# Patient Record
Sex: MALE | Race: BLACK OR AFRICAN AMERICAN | Employment: OTHER | URBAN - METROPOLITAN AREA
[De-identification: names, ages, dates, MRNs, and addresses within clinical notes are randomized per-mention and may not be internally consistent; named-entity substitution may affect disease eponyms.]

---

## 2017-05-19 ENCOUNTER — APPOINTMENT (OUTPATIENT)
Dept: GENERAL RADIOLOGY | Age: 72
End: 2017-05-19
Attending: PHYSICIAN ASSISTANT
Payer: MEDICARE

## 2017-05-19 ENCOUNTER — HOSPITAL ENCOUNTER (EMERGENCY)
Age: 72
Discharge: HOME | End: 2017-05-20
Attending: EMERGENCY MEDICINE | Admitting: EMERGENCY MEDICINE
Payer: MEDICARE

## 2017-05-19 VITALS
RESPIRATION RATE: 16 BRPM | DIASTOLIC BLOOD PRESSURE: 86 MMHG | HEIGHT: 70 IN | WEIGHT: 186.8 LBS | HEART RATE: 60 BPM | SYSTOLIC BLOOD PRESSURE: 153 MMHG | TEMPERATURE: 98.2 F | BODY MASS INDEX: 26.74 KG/M2 | OXYGEN SATURATION: 97 %

## 2017-05-19 DIAGNOSIS — M25.461 KNEE EFFUSION, RIGHT: Primary | ICD-10-CM

## 2017-05-19 PROCEDURE — 75810000054 HC ARTHOCENTISIS JOINT

## 2017-05-19 PROCEDURE — 99283 EMERGENCY DEPT VISIT LOW MDM: CPT

## 2017-05-19 PROCEDURE — 74011250636 HC RX REV CODE- 250/636: Performed by: PHYSICIAN ASSISTANT

## 2017-05-19 PROCEDURE — 89050 BODY FLUID CELL COUNT: CPT | Performed by: PHYSICIAN ASSISTANT

## 2017-05-19 PROCEDURE — 87205 SMEAR GRAM STAIN: CPT | Performed by: PHYSICIAN ASSISTANT

## 2017-05-19 PROCEDURE — 89060 EXAM SYNOVIAL FLUID CRYSTALS: CPT | Performed by: PHYSICIAN ASSISTANT

## 2017-05-19 PROCEDURE — 74011250637 HC RX REV CODE- 250/637: Performed by: PHYSICIAN ASSISTANT

## 2017-05-19 PROCEDURE — 74011000250 HC RX REV CODE- 250: Performed by: PHYSICIAN ASSISTANT

## 2017-05-19 RX ORDER — OXYCODONE AND ACETAMINOPHEN 5; 325 MG/1; MG/1
1 TABLET ORAL
Status: COMPLETED | OUTPATIENT
Start: 2017-05-19 | End: 2017-05-19

## 2017-05-19 RX ORDER — LIDOCAINE HYDROCHLORIDE 20 MG/ML
10 INJECTION, SOLUTION INFILTRATION; PERINEURAL ONCE
Status: COMPLETED | OUTPATIENT
Start: 2017-05-19 | End: 2017-05-19

## 2017-05-19 RX ORDER — TRIAMCINOLONE ACETONIDE 40 MG/ML
40 INJECTION, SUSPENSION INTRA-ARTICULAR; INTRAMUSCULAR ONCE
Status: COMPLETED | OUTPATIENT
Start: 2017-05-19 | End: 2017-05-19

## 2017-05-19 RX ADMIN — LIDOCAINE HYDROCHLORIDE 200 MG: 20 INJECTION, SOLUTION INFILTRATION; PERINEURAL at 22:32

## 2017-05-19 RX ADMIN — LIDOCAINE HYDROCHLORIDE 200 MG: 20 INJECTION, SOLUTION INFILTRATION; PERINEURAL at 23:41

## 2017-05-19 RX ADMIN — TRIAMCINOLONE ACETONIDE 40 MG: 40 INJECTION, SUSPENSION INTRA-ARTICULAR; INTRAMUSCULAR at 22:32

## 2017-05-19 RX ADMIN — OXYCODONE HYDROCHLORIDE AND ACETAMINOPHEN 1 TABLET: 5; 325 TABLET ORAL at 22:58

## 2017-05-20 LAB
APPEARANCE FLD: ABNORMAL
BODY FLD TYPE: NORMAL
COLOR FLD: ABNORMAL
CRYSTALS FLD MICRO: NORMAL
EOSINOPHIL # FLD: 10 %
LYMPHOCYTES NFR FLD: 43 %
MONOS+MACROS NFR FLD: 16 %
NEUTS SEG NFR FLD: 29 %
NUC CELL # FLD: 470 /CU MM (ref 0–5)
OTHER CELL,FOTHC: 2 %
RBC # FLD: >100 /CU MM
SPECIMEN SOURCE FLD: ABNORMAL

## 2017-05-20 NOTE — DISCHARGE INSTRUCTIONS
We hope that we have addressed all of your medical concerns. The examination and treatment you received in the Emergency Department were for an emergent problem and were not intended as complete care. It is important that you follow up with your healthcare provider(s) for ongoing care. If your symptoms worsen or do not improve as expected, and you are unable to reach your usual health care provider(s), you should return to the Emergency Department. Today's healthcare is undergoing tremendous change, and patient satisfaction surveys are one of the many tools to assess the quality of medical care. You may receive a survey from the [employer] regarding your experience in the Emergency Department. I hope that your experience has been completely positive, particularly the medical care that I provided. As such, please participate in the survey; anything less than excellent does not meet my expectations or intentions. [facility] and 8 [facility] participate in nationally recognized quality of care measures. If your blood pressure is greater than 120/80, as reported below, we urge that you seek medical care to address the potential of high blood pressure, commonly known as hypertension. Hypertension can be hereditary or can be caused by certain medical conditions, pain, stress, or \"white coat syndrome. \"       Please make an appointment with your health care provider(s) for follow up of your Emergency Department visit. VITALS:   Patient Vitals for the past 8 hrs:   Temp Pulse Resp BP SpO2   05/19/17 2333 98.2 °F (36.8 °C) 60 16 153/86 97 %   05/19/17 2106 98.2 °F (36.8 °C) 68 16 156/81 95 %          Thank you for allowing us to provide you with medical care today. We realize that you have many choices for your emergency care needs. Please choose us in the future for any continued health care needs.       Regards,           [facility], [address] [place], [place] [address].   Office: [phone]            Recent Results (from the past 24 hour(s))   CELL COUNT, BODY FLUID    Collection Time: 05/19/17 11:00 PM   Result Value Ref Range    BODY FLUID TYPE KNEE FLUID      FLUID COLOR RED      FLUID APPEARANCE BLOODY      FLUID RBC COUNT >100 /cu mm    FLD NUCLEATED CELLS 470 (H) 0 - 5 /cu mm   CRYSTALS, SYNOVIAL FLUID    Collection Time: 05/19/17 11:00 PM   Result Value Ref Range    FLUID TYPE(7) KNEE      Crystals, body fluid NO CRYSTALS SEEN WITH POLARIZED LIGHT     CULTURE, BODY FLUID W GRAM STAIN    Collection Time: 05/19/17 11:00 PM   Result Value Ref Range    Special Requests: NO SPECIAL REQUESTS      GRAM STAIN OCCASIONAL  WBCS SEEN        GRAM STAIN NO ORGANISMS SEEN      Culture result: PENDING        No results found. Knee Pain or Injury: Care Instructions  Your Care Instructions    Injuries are a common cause of knee problems. Sudden (acute) injuries may be caused by a direct blow to the knee. They can also be caused by abnormal twisting, bending, or falling on the knee. Pain, bruising, or swelling may be severe, and may start within minutes of the injury. Overuse is another cause of knee pain. Other causes are climbing stairs, kneeling, and other activities that use the knee. Everyday wear and tear, especially as you get older, also can cause knee pain. Rest, along with home treatment, often relieves pain and allows your knee to heal. If you have a serious knee injury, you may need tests and treatment. Follow-up care is a key part of your treatment and safety. Be sure to make and go to all appointments, and call your doctor if you are having problems. It's also a good idea to know your test results and keep a list of the medicines you take. How can you care for yourself at home? · Be safe with medicines. Read and follow all instructions on the label.   ¨ If the doctor gave you a prescription medicine for pain, take it as prescribed. ¨ If you are not taking a prescription pain medicine, ask your doctor if you can take an over-the-counter medicine. · Rest and protect your knee. Take a break from any activity that may cause pain. · Put ice or a cold pack on your knee for 10 to 20 minutes at a time. Put a thin cloth between the ice and your skin. · Prop up a sore knee on a pillow when you ice it or anytime you sit or lie down for the next 3 days. Try to keep it above the level of your heart. This will help reduce swelling. · If your knee is not swollen, you can put moist heat, a heating pad, or a warm cloth on your knee. · If your doctor recommends an elastic bandage, sleeve, or other type of support for your knee, wear it as directed. · Follow your doctor's instructions about how much weight you can put on your leg. Use a cane, crutches, or a walker as instructed. · Follow your doctor's instructions about activity during your healing process. If you can do mild exercise, slowly increase your activity. · Reach and stay at a healthy weight. Extra weight can strain the joints, especially the knees and hips, and make the pain worse. Losing even a few pounds may help. When should you call for help? Call 911 anytime you think you may need emergency care. For example, call if:  · You have symptoms of a blood clot in your lung (called a pulmonary embolism). These may include:  ¨ Sudden chest pain. ¨ Trouble breathing. ¨ Coughing up blood. Call your doctor now or seek immediate medical care if:  · You have severe or increasing pain. · Your leg or foot turns cold or changes color. · You cannot stand or put weight on your knee. · Your knee looks twisted or bent out of shape. · You cannot move your knee. · You have signs of infection, such as:  ¨ Increased pain, swelling, warmth, or redness. ¨ Red streaks leading from the knee. ¨ Pus draining from a place on your knee. ¨ A fever.   · You have signs of a blood clot in your leg (called a deep vein thrombosis), such as:  ¨ Pain in your calf, back of the knee, thigh, or groin. ¨ Redness and swelling in your leg or groin. Watch closely for changes in your health, and be sure to contact your doctor if:  · You have tingling, weakness, or numbness in your knee. · You have any new symptoms, such as swelling. · You have bruises from a knee injury that last longer than 2 weeks. · You do not get better as expected. Where can you learn more? Go to http://do-kenyatta.info/. Enter K195 in the search box to learn more about \"Knee Pain or Injury: Care Instructions. \"  Current as of: May 27, 2016  Content Version: 11.2  © 4411-3584 [employer]. Care instructions adapted under license by [employer] (which disclaims liability or warranty for this information). If you have questions about a medical condition or this instruction, always ask your healthcare professional. [address] any warranty or liability for your use of this information.

## 2017-05-20 NOTE — ED TRIAGE NOTES
TRIAGE NOTE: \"I had surgery on my right knee 2/9/17 and fluid has built up on it. It's swollen and real tight. \" Patient reports having knee drained before.

## 2017-05-20 NOTE — ED NOTES
I have reviewed discharge instructions with the patient. The patient verbalized understanding. Ambulatory off unit in no acute signs of distress with family.

## 2017-05-20 NOTE — ED PROVIDER NOTES
HPI Comments: 79 yo AAM with medical hx remarkable for arthritis, GERD, hypertension, carpal tunnel syndrome, presenting ambulatory to the ED with complaint of swelling to the rt knee since yesterday. Applied ice with minimal resolution. Hx of the same. Arthroscopy by Dr. Dawn Wilkinson in February 2017. Denies recent trauma. No fever, headache, sore throat, cough, rhinorrhea, sneezing, SOB, abdominal pain, nausea, vomiting, or urinary complaints. Patient is a 80 y.o. male presenting with knee pain. The history is provided by the patient. Knee Pain    Associated symptoms include neck pain. Pertinent negatives include no numbness. Past Medical History:   Diagnosis Date    Arthritis     thumbs    GERD (gastroesophageal reflux disease)     hx of    Hypertension     Other ill-defined conditions     bilateral carpal tunnel       Past Surgical History:   Procedure Laterality Date    HX APPENDECTOMY      HX KNEE ARTHROSCOPY      for torn menicus    HX ORTHOPAEDIC      back    HX OTHER SURGICAL      lipoma removed         Family History:   Problem Relation Age of Onset    Kidney Disease Mother      dialysis    Cancer Father      prostate       Social History     Social History    Marital status:      Spouse name: N/A    Number of children: N/A    Years of education: N/A     Occupational History    Not on file. Social History Main Topics    Smoking status: Never Smoker    Smokeless tobacco: Not on file    Alcohol use No    Drug use: Not on file    Sexual activity: Not on file     Other Topics Concern    Not on file     Social History Narrative         ALLERGIES: Review of patient's allergies indicates no known allergies. Review of Systems   Constitutional: Negative. Negative for chills and fever. HENT: Negative for congestion, ear pain, rhinorrhea, sore throat and voice change. Eyes: Negative. Negative for photophobia, pain and itching.    Respiratory: Negative for cough, chest tightness and shortness of breath. Cardiovascular: Negative for chest pain and palpitations. Gastrointestinal: Negative for abdominal distention, abdominal pain, constipation, diarrhea and vomiting. Genitourinary: Negative for difficulty urinating, dysuria, frequency and urgency. Musculoskeletal: Positive for arthralgias (rt knee), joint swelling and neck pain. Negative for neck stiffness. Skin: Positive for color change. Neurological: Negative for weakness, numbness and headaches. Psychiatric/Behavioral: Negative for confusion and decreased concentration. All other systems reviewed and are negative. Vitals:    05/19/17 2106   BP: 156/81   Pulse: 68   Resp: 16   Temp: 98.2 °F (36.8 °C)   SpO2: 95%   Weight: 84.7 kg (186 lb 12.8 oz)   Height: 5' 10\" (1.778 m)            Physical Exam   Constitutional: He is oriented to person, place, and time. He appears well-developed and well-nourished. No distress. Well appearing AAM in [facility]   HENT:   Head: Normocephalic and atraumatic. Right Ear: External ear normal.   Left Ear: External ear normal.   Nose: Nose normal.   Mouth/Throat: Oropharynx is clear and moist. No oropharyngeal exudate. Eyes: Conjunctivae and EOM are normal. Pupils are equal, round, and reactive to light. Right eye exhibits no discharge. Left eye exhibits no discharge. Neck: Normal range of motion. Neck supple. Cardiovascular: Normal rate, regular rhythm and normal heart sounds. Pulmonary/Chest: Effort normal and breath sounds normal. He has no wheezes. He has no rales. Abdominal: Soft. Bowel sounds are normal. He exhibits no distension. There is no tenderness. There is no guarding. Musculoskeletal: Normal range of motion. Right knee: He exhibits swelling. He exhibits normal range of motion. Tenderness found. Lymphadenopathy:     He has no cervical adenopathy. Neurological: He is alert and oriented to person, place, and time. No cranial nerve deficit. Skin: Skin is warm and dry. He is not diaphoretic. Psychiatric: He has a normal mood and affect. His behavior is normal.   Nursing note and vitals reviewed. MDM  Number of Diagnoses or Management Options  Knee effusion, right:   Diagnosis management comments: 81 yo male with rt knee effusion  Plan  Arthrocentesis. Nery ALARCON [facility], [id number]         Amount and/or Complexity of Data Reviewed  Clinical lab tests: ordered and reviewed      ED Course       Arthrocentesis  Date/Time: 5/19/2017 10:30 PM  Performed by: Nakia Carey  Authorized by: Augusta ALARCON     Consent:     Consent obtained:  Verbal    Consent given by:  Patient    Risks discussed:  Bleeding and incomplete drainage  Location:     Location:  Knee    Knee:  R knee  Procedure details:     Needle gauge:  18 G    Approach:  Lateral (and medial)    Aspirate characteristics:  Blood-tinged    Steroid injected: yes      Specimen collected: yes    Post-procedure details:     Dressing:  Gauze roll    Patient tolerance of procedure: Tolerated well, no immediate complications        Patient's results have been reviewed with them. Patient and/or family have verbally conveyed their understanding and agreement of the patient's signs, symptoms, diagnosis, treatment and prognosis and additionally agree to follow up as recommended or return to the Emergency Room should their condition change prior to follow-up. Discharge instructions have also been provided to the patient with some educational information regarding their diagnosis as well a list of reasons why they would want to return to the ER prior to their follow-up appointment should their condition change. Nery ALARCON [facility], [address]    A/P  Rt knee effusion: Follow-up with ortho. Elevate the leg. Percocet 1 tab every 6 hrs as needed for pain. Return for any new or worsening.  Nery ALARCON [facility], [address]

## 2017-06-02 LAB
BACTERIA SPEC CULT: NORMAL
GRAM STN SPEC: NORMAL
GRAM STN SPEC: NORMAL
SERVICE CMNT-IMP: NORMAL

## 2017-08-04 RX ORDER — ACETAMINOPHEN 500 MG
1000 TABLET ORAL ONCE
Status: CANCELLED | OUTPATIENT
Start: 2017-08-14 | End: 2017-08-14

## 2017-08-04 RX ORDER — CELECOXIB 200 MG/1
200 CAPSULE ORAL ONCE
Status: CANCELLED | OUTPATIENT
Start: 2017-08-14 | End: 2017-08-14

## 2017-08-04 RX ORDER — PREGABALIN 75 MG/1
75 CAPSULE ORAL ONCE
Status: CANCELLED | OUTPATIENT
Start: 2017-08-14 | End: 2017-08-14

## 2017-08-04 RX ORDER — CEFAZOLIN SODIUM IN 0.9 % NACL 2 G/50 ML
2 INTRAVENOUS SOLUTION, PIGGYBACK (ML) INTRAVENOUS ONCE
Status: CANCELLED | OUTPATIENT
Start: 2017-08-14 | End: 2017-08-14

## 2017-08-04 RX ORDER — DEXAMETHASONE SODIUM PHOSPHATE 100 MG/10ML
10 INJECTION INTRAMUSCULAR; INTRAVENOUS ONCE
Status: CANCELLED | OUTPATIENT
Start: 2017-08-14 | End: 2017-08-14

## 2017-08-07 ENCOUNTER — HOSPITAL ENCOUNTER (OUTPATIENT)
Dept: PREADMISSION TESTING | Age: 72
Discharge: HOME | End: 2017-08-07
Payer: MEDICARE

## 2017-08-07 VITALS
DIASTOLIC BLOOD PRESSURE: 79 MMHG | BODY MASS INDEX: 25.91 KG/M2 | WEIGHT: 181 LBS | TEMPERATURE: 97.9 F | HEIGHT: 70 IN | SYSTOLIC BLOOD PRESSURE: 167 MMHG | HEART RATE: 55 BPM

## 2017-08-07 LAB
ABO + RH BLD: NORMAL
ANION GAP BLD CALC-SCNC: 6 MMOL/L (ref 5–15)
APPEARANCE UR: CLEAR
ATRIAL RATE: 57 BPM
BACTERIA URNS QL MICRO: NEGATIVE /HPF
BILIRUB UR QL: NEGATIVE
BLOOD GROUP ANTIBODIES SERPL: NORMAL
BUN SERPL-MCNC: 17 MG/DL (ref 6–20)
BUN/CREAT SERPL: 14 (ref 12–20)
CALCIUM SERPL-MCNC: 8.7 MG/DL (ref 8.5–10.1)
CALCULATED P AXIS, ECG09: 49 DEGREES
CALCULATED R AXIS, ECG10: 36 DEGREES
CALCULATED T AXIS, ECG11: 25 DEGREES
CHLORIDE SERPL-SCNC: 105 MMOL/L (ref 97–108)
CO2 SERPL-SCNC: 30 MMOL/L (ref 21–32)
COLOR UR: ABNORMAL
CREAT SERPL-MCNC: 1.25 MG/DL (ref 0.7–1.3)
DIAGNOSIS, 93000: NORMAL
EPITH CASTS URNS QL MICRO: ABNORMAL /LPF
ERYTHROCYTE [DISTWIDTH] IN BLOOD BY AUTOMATED COUNT: 12.4 % (ref 11.5–14.5)
EST. AVERAGE GLUCOSE BLD GHB EST-MCNC: 120 MG/DL
GLUCOSE SERPL-MCNC: 102 MG/DL (ref 65–100)
GLUCOSE UR STRIP.AUTO-MCNC: NEGATIVE MG/DL
HBA1C MFR BLD: 5.8 % (ref 4.2–6.3)
HCT VFR BLD AUTO: 34.6 % (ref 36.6–50.3)
HGB BLD-MCNC: 11.9 G/DL (ref 12.1–17)
HGB UR QL STRIP: NEGATIVE
HYALINE CASTS URNS QL MICRO: ABNORMAL /LPF (ref 0–5)
INR PPP: 1.1 (ref 0.9–1.1)
KETONES UR QL STRIP.AUTO: NEGATIVE MG/DL
LEUKOCYTE ESTERASE UR QL STRIP.AUTO: NEGATIVE
MCH RBC QN AUTO: 30.1 PG (ref 26–34)
MCHC RBC AUTO-ENTMCNC: 34.4 G/DL (ref 30–36.5)
MCV RBC AUTO: 87.4 FL (ref 80–99)
NITRITE UR QL STRIP.AUTO: NEGATIVE
P-R INTERVAL, ECG05: 166 MS
PH UR STRIP: 7 [PH] (ref 5–8)
PLATELET # BLD AUTO: 213 K/UL (ref 150–400)
POTASSIUM SERPL-SCNC: 3.8 MMOL/L (ref 3.5–5.1)
PROT UR STRIP-MCNC: 30 MG/DL
PROTHROMBIN TIME: 11 SEC (ref 9–11.1)
Q-T INTERVAL, ECG07: 424 MS
QRS DURATION, ECG06: 90 MS
QTC CALCULATION (BEZET), ECG08: 412 MS
RBC # BLD AUTO: 3.96 M/UL (ref 4.1–5.7)
RBC #/AREA URNS HPF: ABNORMAL /HPF (ref 0–5)
SODIUM SERPL-SCNC: 141 MMOL/L (ref 136–145)
SP GR UR REFRACTOMETRY: 1.02 (ref 1–1.03)
SPECIMEN EXP DATE BLD: NORMAL
UA: UC IF INDICATED,UAUC: ABNORMAL
UROBILINOGEN UR QL STRIP.AUTO: 0.2 EU/DL (ref 0.2–1)
VENTRICULAR RATE, ECG03: 57 BPM
WBC # BLD AUTO: 3.5 K/UL (ref 4.1–11.1)
WBC URNS QL MICRO: ABNORMAL /HPF (ref 0–4)

## 2017-08-07 PROCEDURE — 85027 COMPLETE CBC AUTOMATED: CPT | Performed by: ORTHOPAEDIC SURGERY

## 2017-08-07 PROCEDURE — 83036 HEMOGLOBIN GLYCOSYLATED A1C: CPT | Performed by: ORTHOPAEDIC SURGERY

## 2017-08-07 PROCEDURE — 81001 URINALYSIS AUTO W/SCOPE: CPT | Performed by: ORTHOPAEDIC SURGERY

## 2017-08-07 PROCEDURE — 93005 ELECTROCARDIOGRAM TRACING: CPT

## 2017-08-07 PROCEDURE — 85610 PROTHROMBIN TIME: CPT | Performed by: ORTHOPAEDIC SURGERY

## 2017-08-07 PROCEDURE — 86900 BLOOD TYPING SEROLOGIC ABO: CPT | Performed by: ORTHOPAEDIC SURGERY

## 2017-08-07 PROCEDURE — 80048 BASIC METABOLIC PNL TOTAL CA: CPT | Performed by: ORTHOPAEDIC SURGERY

## 2017-08-07 PROCEDURE — 36415 COLL VENOUS BLD VENIPUNCTURE: CPT | Performed by: ORTHOPAEDIC SURGERY

## 2017-08-07 RX ORDER — CALCIUM CARBONATE 600 MG
600 TABLET ORAL DAILY
COMMUNITY

## 2017-08-07 RX ORDER — DOXAZOSIN 2 MG/1
2 TABLET ORAL
COMMUNITY

## 2017-08-07 RX ORDER — LANOLIN ALCOHOL/MO/W.PET/CERES
65 CREAM (GRAM) TOPICAL
COMMUNITY

## 2017-08-08 LAB
BACTERIA SPEC CULT: NORMAL
BACTERIA SPEC CULT: NORMAL
SERVICE CMNT-IMP: NORMAL

## 2017-08-14 ENCOUNTER — ANESTHESIA (OUTPATIENT)
Dept: SURGERY | Age: 72
End: 2017-08-14
Payer: MEDICARE

## 2017-08-14 ENCOUNTER — ANESTHESIA EVENT (OUTPATIENT)
Dept: SURGERY | Age: 72
End: 2017-08-14
Payer: MEDICARE

## 2017-08-14 ENCOUNTER — HOSPITAL ENCOUNTER (OUTPATIENT)
Age: 72
Setting detail: OBSERVATION
Discharge: HOME | End: 2017-08-15
Attending: ORTHOPAEDIC SURGERY | Admitting: ORTHOPAEDIC SURGERY
Payer: MEDICARE

## 2017-08-14 PROBLEM — M17.11 OSTEOARTHRITIS OF RIGHT KNEE: Status: ACTIVE | Noted: 2017-08-14

## 2017-08-14 LAB
GLUCOSE BLD STRIP.AUTO-MCNC: 122 MG/DL (ref 65–100)
SERVICE CMNT-IMP: ABNORMAL

## 2017-08-14 PROCEDURE — 82962 GLUCOSE BLOOD TEST: CPT

## 2017-08-14 PROCEDURE — 77030020782 HC GWN BAIR PAWS FLX 3M -B

## 2017-08-14 PROCEDURE — 97165 OT EVAL LOW COMPLEX 30 MIN: CPT | Performed by: OCCUPATIONAL THERAPIST

## 2017-08-14 PROCEDURE — 99218 HC RM OBSERVATION: CPT

## 2017-08-14 PROCEDURE — 74011250636 HC RX REV CODE- 250/636: Performed by: ANESTHESIOLOGY

## 2017-08-14 PROCEDURE — G8988 SELF CARE GOAL STATUS: HCPCS | Performed by: OCCUPATIONAL THERAPIST

## 2017-08-14 PROCEDURE — 76010000171 HC OR TIME 2 TO 2.5 HR INTENSV-TIER 1: Performed by: ORTHOPAEDIC SURGERY

## 2017-08-14 PROCEDURE — 74011000250 HC RX REV CODE- 250: Performed by: ORTHOPAEDIC SURGERY

## 2017-08-14 PROCEDURE — C1776 JOINT DEVICE (IMPLANTABLE): HCPCS | Performed by: ORTHOPAEDIC SURGERY

## 2017-08-14 PROCEDURE — 74011250636 HC RX REV CODE- 250/636

## 2017-08-14 PROCEDURE — 76060000035 HC ANESTHESIA 2 TO 2.5 HR: Performed by: ORTHOPAEDIC SURGERY

## 2017-08-14 PROCEDURE — 74011000258 HC RX REV CODE- 258: Performed by: ORTHOPAEDIC SURGERY

## 2017-08-14 PROCEDURE — 74011250637 HC RX REV CODE- 250/637: Performed by: ORTHOPAEDIC SURGERY

## 2017-08-14 PROCEDURE — 77030000032 HC CUF TRNQT ZIMM -B: Performed by: ORTHOPAEDIC SURGERY

## 2017-08-14 PROCEDURE — 77030007866 HC KT SPN ANES BBMI -B: Performed by: ANESTHESIOLOGY

## 2017-08-14 PROCEDURE — 77030012935 HC DRSG AQUACEL BMS -B: Performed by: ORTHOPAEDIC SURGERY

## 2017-08-14 PROCEDURE — 97535 SELF CARE MNGMENT TRAINING: CPT | Performed by: OCCUPATIONAL THERAPIST

## 2017-08-14 PROCEDURE — 77030020788: Performed by: ORTHOPAEDIC SURGERY

## 2017-08-14 PROCEDURE — 97161 PT EVAL LOW COMPLEX 20 MIN: CPT

## 2017-08-14 PROCEDURE — 77030031139 HC SUT VCRL2 J&J -A: Performed by: ORTHOPAEDIC SURGERY

## 2017-08-14 PROCEDURE — 77030011640 HC PAD GRND REM COVD -A: Performed by: ORTHOPAEDIC SURGERY

## 2017-08-14 PROCEDURE — 74011250636 HC RX REV CODE- 250/636: Performed by: ORTHOPAEDIC SURGERY

## 2017-08-14 PROCEDURE — C9290 INJ, BUPIVACAINE LIPOSOME: HCPCS | Performed by: ORTHOPAEDIC SURGERY

## 2017-08-14 PROCEDURE — G8979 MOBILITY GOAL STATUS: HCPCS

## 2017-08-14 PROCEDURE — G8978 MOBILITY CURRENT STATUS: HCPCS

## 2017-08-14 PROCEDURE — 77030011943: Performed by: ORTHOPAEDIC SURGERY

## 2017-08-14 PROCEDURE — 77030018836 HC SOL IRR NACL ICUM -A: Performed by: ORTHOPAEDIC SURGERY

## 2017-08-14 PROCEDURE — G8989 SELF CARE D/C STATUS: HCPCS | Performed by: OCCUPATIONAL THERAPIST

## 2017-08-14 PROCEDURE — 76210000016 HC OR PH I REC 1 TO 1.5 HR: Performed by: ORTHOPAEDIC SURGERY

## 2017-08-14 PROCEDURE — 97116 GAIT TRAINING THERAPY: CPT

## 2017-08-14 PROCEDURE — 77030002933 HC SUT MCRYL J&J -A: Performed by: ORTHOPAEDIC SURGERY

## 2017-08-14 PROCEDURE — 77030010507 HC ADH SKN DERMBND J&J -B: Performed by: ORTHOPAEDIC SURGERY

## 2017-08-14 PROCEDURE — 77030003601 HC NDL NRV BLK BBMI -A

## 2017-08-14 PROCEDURE — 77030006812 HC BLD SAW RECIP STRY -B: Performed by: ORTHOPAEDIC SURGERY

## 2017-08-14 PROCEDURE — G8987 SELF CARE CURRENT STATUS: HCPCS | Performed by: OCCUPATIONAL THERAPIST

## 2017-08-14 PROCEDURE — 77030028224 HC PDNG CST BSNM -A: Performed by: ORTHOPAEDIC SURGERY

## 2017-08-14 PROCEDURE — 77030033067 HC SUT PDO STRATFX SPIR J&J -B: Performed by: ORTHOPAEDIC SURGERY

## 2017-08-14 PROCEDURE — 76010000131 HC OR TIME 2 TO 2.5 HR: Performed by: ORTHOPAEDIC SURGERY

## 2017-08-14 PROCEDURE — 77030006835 HC BLD SAW SAG STRY -B: Performed by: ORTHOPAEDIC SURGERY

## 2017-08-14 PROCEDURE — G8980 MOBILITY D/C STATUS: HCPCS

## 2017-08-14 PROCEDURE — C1713 ANCHOR/SCREW BN/BN,TIS/BN: HCPCS | Performed by: ORTHOPAEDIC SURGERY

## 2017-08-14 PROCEDURE — 74011000250 HC RX REV CODE- 250

## 2017-08-14 PROCEDURE — 77030010788: Performed by: ORTHOPAEDIC SURGERY

## 2017-08-14 DEVICE — CEMENT BNE 40GM FULL DOSE PMMA W/ GENT HI VISC RADPQ LNG: Type: IMPLANTABLE DEVICE | Site: KNEE | Status: FUNCTIONAL

## 2017-08-14 DEVICE — ARTICULAR SURFACE SIZE 4 10MM
Type: IMPLANTABLE DEVICE | Site: KNEE | Status: FUNCTIONAL
Brand: ZUK

## 2017-08-14 DEVICE — FEMORAL SIZE D RT MEDIAL/LT LATERAL
Type: IMPLANTABLE DEVICE | Site: KNEE | Status: FUNCTIONAL
Brand: ZUK

## 2017-08-14 DEVICE — KNEE CEM FLX FEM/TIB UNIFLX -- K1 BSV-KY-SC K2-NY: Type: IMPLANTABLE DEVICE | Status: FUNCTIONAL

## 2017-08-14 DEVICE — TIBIA BASE SIZE 4 RIGHT                                    MEDIAL/LEFT LATERAL
Type: IMPLANTABLE DEVICE | Site: KNEE | Status: FUNCTIONAL
Brand: ZUK

## 2017-08-14 RX ORDER — SODIUM CHLORIDE 9 MG/ML
25 INJECTION, SOLUTION INTRAVENOUS CONTINUOUS
Status: DISCONTINUED | OUTPATIENT
Start: 2017-08-14 | End: 2017-08-14 | Stop reason: HOSPADM

## 2017-08-14 RX ORDER — ASPIRIN 325 MG
325 TABLET, DELAYED RELEASE (ENTERIC COATED) ORAL 2 TIMES DAILY
Qty: 60 TAB | Refills: 0 | Status: SHIPPED | OUTPATIENT
Start: 2017-08-14

## 2017-08-14 RX ORDER — PROPOFOL 10 MG/ML
INJECTION, EMULSION INTRAVENOUS
Status: DISCONTINUED | OUTPATIENT
Start: 2017-08-14 | End: 2017-08-14 | Stop reason: HOSPADM

## 2017-08-14 RX ORDER — DIPHENHYDRAMINE HYDROCHLORIDE 50 MG/ML
12.5 INJECTION, SOLUTION INTRAMUSCULAR; INTRAVENOUS AS NEEDED
Status: DISCONTINUED | OUTPATIENT
Start: 2017-08-14 | End: 2017-08-14 | Stop reason: HOSPADM

## 2017-08-14 RX ORDER — DOXAZOSIN 2 MG/1
2 TABLET ORAL
Status: DISCONTINUED | OUTPATIENT
Start: 2017-08-14 | End: 2017-08-15 | Stop reason: HOSPADM

## 2017-08-14 RX ORDER — DICLOFENAC SODIUM 50 MG/1
50 TABLET, DELAYED RELEASE ORAL 2 TIMES DAILY
Qty: 42 TAB | Refills: 0 | Status: SHIPPED | OUTPATIENT
Start: 2017-08-15

## 2017-08-14 RX ORDER — CEFAZOLIN SODIUM IN 0.9 % NACL 2 G/50 ML
2 INTRAVENOUS SOLUTION, PIGGYBACK (ML) INTRAVENOUS ONCE
Status: COMPLETED | OUTPATIENT
Start: 2017-08-14 | End: 2017-08-14

## 2017-08-14 RX ORDER — DEXAMETHASONE SODIUM PHOSPHATE 10 MG/ML
10 INJECTION INTRAMUSCULAR; INTRAVENOUS ONCE
Status: COMPLETED | OUTPATIENT
Start: 2017-08-14 | End: 2017-08-14

## 2017-08-14 RX ORDER — ONDANSETRON 2 MG/ML
4 INJECTION INTRAMUSCULAR; INTRAVENOUS
Status: ACTIVE | OUTPATIENT
Start: 2017-08-14 | End: 2017-08-15

## 2017-08-14 RX ORDER — TRAMADOL HYDROCHLORIDE 50 MG/1
50 TABLET ORAL EVERY 6 HOURS
Qty: 80 TAB | Refills: 0 | Status: SHIPPED | OUTPATIENT
Start: 2017-08-14 | End: 2019-03-06

## 2017-08-14 RX ORDER — FACIAL-BODY WIPES
10 EACH TOPICAL DAILY PRN
Status: DISCONTINUED | OUTPATIENT
Start: 2017-08-16 | End: 2017-08-15 | Stop reason: HOSPADM

## 2017-08-14 RX ORDER — LOSARTAN POTASSIUM 50 MG/1
100 TABLET ORAL DAILY
Status: DISCONTINUED | OUTPATIENT
Start: 2017-08-15 | End: 2017-08-15 | Stop reason: HOSPADM

## 2017-08-14 RX ORDER — HYDROXYZINE HYDROCHLORIDE 10 MG/1
10 TABLET, FILM COATED ORAL
Status: DISCONTINUED | OUTPATIENT
Start: 2017-08-14 | End: 2017-08-15 | Stop reason: HOSPADM

## 2017-08-14 RX ORDER — CARVEDILOL 12.5 MG/1
12.5 TABLET ORAL 2 TIMES DAILY WITH MEALS
Status: DISCONTINUED | OUTPATIENT
Start: 2017-08-14 | End: 2017-08-15 | Stop reason: HOSPADM

## 2017-08-14 RX ORDER — SODIUM CHLORIDE 0.9 % (FLUSH) 0.9 %
5-10 SYRINGE (ML) INJECTION AS NEEDED
Status: DISCONTINUED | OUTPATIENT
Start: 2017-08-14 | End: 2017-08-15 | Stop reason: HOSPADM

## 2017-08-14 RX ORDER — DEXMEDETOMIDINE HYDROCHLORIDE 4 UG/ML
INJECTION, SOLUTION INTRAVENOUS AS NEEDED
Status: DISCONTINUED | OUTPATIENT
Start: 2017-08-14 | End: 2017-08-14 | Stop reason: HOSPADM

## 2017-08-14 RX ORDER — ACETAMINOPHEN 325 MG/1
650 TABLET ORAL EVERY 6 HOURS
Status: DISCONTINUED | OUTPATIENT
Start: 2017-08-14 | End: 2017-08-15 | Stop reason: HOSPADM

## 2017-08-14 RX ORDER — MIDAZOLAM HYDROCHLORIDE 1 MG/ML
1 INJECTION, SOLUTION INTRAMUSCULAR; INTRAVENOUS AS NEEDED
Status: DISCONTINUED | OUTPATIENT
Start: 2017-08-14 | End: 2017-08-14 | Stop reason: HOSPADM

## 2017-08-14 RX ORDER — TRANEXAMIC ACID 100 MG/ML
2000 INJECTION, SOLUTION INTRAVENOUS ONCE
Status: COMPLETED | OUTPATIENT
Start: 2017-08-14 | End: 2017-08-14

## 2017-08-14 RX ORDER — ROPIVACAINE HYDROCHLORIDE 5 MG/ML
INJECTION, SOLUTION EPIDURAL; INFILTRATION; PERINEURAL AS NEEDED
Status: DISCONTINUED | OUTPATIENT
Start: 2017-08-14 | End: 2017-08-14 | Stop reason: HOSPADM

## 2017-08-14 RX ORDER — CEFAZOLIN SODIUM IN 0.9 % NACL 2 G/50 ML
2 INTRAVENOUS SOLUTION, PIGGYBACK (ML) INTRAVENOUS ONCE
Status: DISCONTINUED | OUTPATIENT
Start: 2017-08-14 | End: 2017-08-14 | Stop reason: SDUPTHER

## 2017-08-14 RX ORDER — ACETAMINOPHEN 500 MG
1000 TABLET ORAL ONCE
Status: COMPLETED | OUTPATIENT
Start: 2017-08-14 | End: 2017-08-14

## 2017-08-14 RX ORDER — SODIUM CHLORIDE, SODIUM LACTATE, POTASSIUM CHLORIDE, CALCIUM CHLORIDE 600; 310; 30; 20 MG/100ML; MG/100ML; MG/100ML; MG/100ML
25 INJECTION, SOLUTION INTRAVENOUS CONTINUOUS
Status: DISCONTINUED | OUTPATIENT
Start: 2017-08-14 | End: 2017-08-14 | Stop reason: HOSPADM

## 2017-08-14 RX ORDER — MELATONIN
1000 DAILY
Status: DISCONTINUED | OUTPATIENT
Start: 2017-08-15 | End: 2017-08-15 | Stop reason: HOSPADM

## 2017-08-14 RX ORDER — FAMOTIDINE 20 MG/1
20 TABLET, FILM COATED ORAL 2 TIMES DAILY
Status: DISCONTINUED | OUTPATIENT
Start: 2017-08-14 | End: 2017-08-15 | Stop reason: HOSPADM

## 2017-08-14 RX ORDER — ASPIRIN 325 MG
325 TABLET, DELAYED RELEASE (ENTERIC COATED) ORAL 2 TIMES DAILY
Status: DISCONTINUED | OUTPATIENT
Start: 2017-08-14 | End: 2017-08-15 | Stop reason: HOSPADM

## 2017-08-14 RX ORDER — DICLOFENAC SODIUM 50 MG/1
50 TABLET, DELAYED RELEASE ORAL 2 TIMES DAILY
Status: DISCONTINUED | OUTPATIENT
Start: 2017-08-15 | End: 2017-08-15 | Stop reason: HOSPADM

## 2017-08-14 RX ORDER — ACETAMINOPHEN 500 MG
1000 TABLET ORAL ONCE
Status: DISCONTINUED | OUTPATIENT
Start: 2017-08-14 | End: 2017-08-14 | Stop reason: SDUPTHER

## 2017-08-14 RX ORDER — HYDROMORPHONE HYDROCHLORIDE 1 MG/ML
0.2 INJECTION, SOLUTION INTRAMUSCULAR; INTRAVENOUS; SUBCUTANEOUS
Status: DISCONTINUED | OUTPATIENT
Start: 2017-08-14 | End: 2017-08-14 | Stop reason: HOSPADM

## 2017-08-14 RX ORDER — DEXAMETHASONE SODIUM PHOSPHATE 100 MG/10ML
10 INJECTION INTRAMUSCULAR; INTRAVENOUS ONCE
Status: DISCONTINUED | OUTPATIENT
Start: 2017-08-14 | End: 2017-08-14 | Stop reason: SDUPTHER

## 2017-08-14 RX ORDER — CEFAZOLIN SODIUM IN 0.9 % NACL 2 G/50 ML
2 INTRAVENOUS SOLUTION, PIGGYBACK (ML) INTRAVENOUS EVERY 8 HOURS
Status: COMPLETED | OUTPATIENT
Start: 2017-08-14 | End: 2017-08-14

## 2017-08-14 RX ORDER — AMOXICILLIN 250 MG
1 CAPSULE ORAL 2 TIMES DAILY
Status: DISCONTINUED | OUTPATIENT
Start: 2017-08-14 | End: 2017-08-15 | Stop reason: HOSPADM

## 2017-08-14 RX ORDER — SODIUM CHLORIDE 0.9 % (FLUSH) 0.9 %
5-10 SYRINGE (ML) INJECTION EVERY 8 HOURS
Status: DISCONTINUED | OUTPATIENT
Start: 2017-08-14 | End: 2017-08-14 | Stop reason: HOSPADM

## 2017-08-14 RX ORDER — ONDANSETRON 2 MG/ML
4 INJECTION INTRAMUSCULAR; INTRAVENOUS AS NEEDED
Status: DISCONTINUED | OUTPATIENT
Start: 2017-08-14 | End: 2017-08-14 | Stop reason: HOSPADM

## 2017-08-14 RX ORDER — OXYCODONE AND ACETAMINOPHEN 5; 325 MG/1; MG/1
1 TABLET ORAL AS NEEDED
Status: DISCONTINUED | OUTPATIENT
Start: 2017-08-14 | End: 2017-08-14 | Stop reason: HOSPADM

## 2017-08-14 RX ORDER — SODIUM CHLORIDE 0.9 % (FLUSH) 0.9 %
5-10 SYRINGE (ML) INJECTION AS NEEDED
Status: DISCONTINUED | OUTPATIENT
Start: 2017-08-14 | End: 2017-08-14 | Stop reason: HOSPADM

## 2017-08-14 RX ORDER — POLYETHYLENE GLYCOL 3350 17 G/17G
17 POWDER, FOR SOLUTION ORAL DAILY
Status: DISCONTINUED | OUTPATIENT
Start: 2017-08-14 | End: 2017-08-15 | Stop reason: HOSPADM

## 2017-08-14 RX ORDER — AMLODIPINE BESYLATE 5 MG/1
10 TABLET ORAL DAILY
Status: DISCONTINUED | OUTPATIENT
Start: 2017-08-15 | End: 2017-08-15 | Stop reason: HOSPADM

## 2017-08-14 RX ORDER — MIDAZOLAM HYDROCHLORIDE 1 MG/ML
0.5 INJECTION, SOLUTION INTRAMUSCULAR; INTRAVENOUS
Status: DISCONTINUED | OUTPATIENT
Start: 2017-08-14 | End: 2017-08-14 | Stop reason: HOSPADM

## 2017-08-14 RX ORDER — HYDROMORPHONE HYDROCHLORIDE 1 MG/ML
0.5 INJECTION, SOLUTION INTRAMUSCULAR; INTRAVENOUS; SUBCUTANEOUS
Status: ACTIVE | OUTPATIENT
Start: 2017-08-14 | End: 2017-08-15

## 2017-08-14 RX ORDER — BUPIVACAINE HYDROCHLORIDE 5 MG/ML
INJECTION, SOLUTION EPIDURAL; INTRACAUDAL AS NEEDED
Status: DISCONTINUED | OUTPATIENT
Start: 2017-08-14 | End: 2017-08-14 | Stop reason: HOSPADM

## 2017-08-14 RX ORDER — PREGABALIN 75 MG/1
75 CAPSULE ORAL ONCE
Status: COMPLETED | OUTPATIENT
Start: 2017-08-14 | End: 2017-08-14

## 2017-08-14 RX ORDER — MORPHINE SULFATE 10 MG/ML
2 INJECTION, SOLUTION INTRAMUSCULAR; INTRAVENOUS
Status: DISCONTINUED | OUTPATIENT
Start: 2017-08-14 | End: 2017-08-14 | Stop reason: HOSPADM

## 2017-08-14 RX ORDER — SODIUM CHLORIDE, SODIUM LACTATE, POTASSIUM CHLORIDE, CALCIUM CHLORIDE 600; 310; 30; 20 MG/100ML; MG/100ML; MG/100ML; MG/100ML
INJECTION, SOLUTION INTRAVENOUS
Status: DISCONTINUED | OUTPATIENT
Start: 2017-08-14 | End: 2017-08-14 | Stop reason: HOSPADM

## 2017-08-14 RX ORDER — EPINEPHRINE 1 MG/ML
0.4 INJECTION, SOLUTION, CONCENTRATE INTRAVENOUS ONCE
Status: COMPLETED | OUTPATIENT
Start: 2017-08-14 | End: 2017-08-14

## 2017-08-14 RX ORDER — SODIUM CHLORIDE 9 MG/ML
125 INJECTION, SOLUTION INTRAVENOUS CONTINUOUS
Status: DISPENSED | OUTPATIENT
Start: 2017-08-14 | End: 2017-08-15

## 2017-08-14 RX ORDER — SODIUM CHLORIDE 0.9 % (FLUSH) 0.9 %
5-10 SYRINGE (ML) INJECTION EVERY 8 HOURS
Status: DISCONTINUED | OUTPATIENT
Start: 2017-08-15 | End: 2017-08-15 | Stop reason: HOSPADM

## 2017-08-14 RX ORDER — CELECOXIB 200 MG/1
200 CAPSULE ORAL ONCE
Status: DISCONTINUED | OUTPATIENT
Start: 2017-08-14 | End: 2017-08-14 | Stop reason: SDUPTHER

## 2017-08-14 RX ORDER — CELECOXIB 200 MG/1
200 CAPSULE ORAL ONCE
Status: COMPLETED | OUTPATIENT
Start: 2017-08-14 | End: 2017-08-14

## 2017-08-14 RX ORDER — NALOXONE HYDROCHLORIDE 0.4 MG/ML
0.4 INJECTION, SOLUTION INTRAMUSCULAR; INTRAVENOUS; SUBCUTANEOUS AS NEEDED
Status: DISCONTINUED | OUTPATIENT
Start: 2017-08-14 | End: 2017-08-15 | Stop reason: HOSPADM

## 2017-08-14 RX ORDER — HYDROMORPHONE HYDROCHLORIDE 2 MG/1
2 TABLET ORAL
Qty: 80 TAB | Refills: 0 | Status: SHIPPED | OUTPATIENT
Start: 2017-08-14

## 2017-08-14 RX ORDER — FENTANYL CITRATE 50 UG/ML
50 INJECTION, SOLUTION INTRAMUSCULAR; INTRAVENOUS AS NEEDED
Status: DISCONTINUED | OUTPATIENT
Start: 2017-08-14 | End: 2017-08-14 | Stop reason: HOSPADM

## 2017-08-14 RX ORDER — SODIUM CHLORIDE, SODIUM LACTATE, POTASSIUM CHLORIDE, CALCIUM CHLORIDE 600; 310; 30; 20 MG/100ML; MG/100ML; MG/100ML; MG/100ML
125 INJECTION, SOLUTION INTRAVENOUS CONTINUOUS
Status: DISCONTINUED | OUTPATIENT
Start: 2017-08-14 | End: 2017-08-14 | Stop reason: HOSPADM

## 2017-08-14 RX ORDER — ONDANSETRON 2 MG/ML
INJECTION INTRAMUSCULAR; INTRAVENOUS AS NEEDED
Status: DISCONTINUED | OUTPATIENT
Start: 2017-08-14 | End: 2017-08-14 | Stop reason: HOSPADM

## 2017-08-14 RX ORDER — FENTANYL CITRATE 50 UG/ML
25 INJECTION, SOLUTION INTRAMUSCULAR; INTRAVENOUS
Status: DISCONTINUED | OUTPATIENT
Start: 2017-08-14 | End: 2017-08-14 | Stop reason: HOSPADM

## 2017-08-14 RX ORDER — TRAMADOL HYDROCHLORIDE 50 MG/1
50 TABLET ORAL EVERY 6 HOURS
Status: DISCONTINUED | OUTPATIENT
Start: 2017-08-14 | End: 2017-08-15 | Stop reason: HOSPADM

## 2017-08-14 RX ORDER — PREGABALIN 75 MG/1
75 CAPSULE ORAL ONCE
Status: DISCONTINUED | OUTPATIENT
Start: 2017-08-14 | End: 2017-08-14 | Stop reason: SDUPTHER

## 2017-08-14 RX ORDER — KETOROLAC TROMETHAMINE 30 MG/ML
15 INJECTION, SOLUTION INTRAMUSCULAR; INTRAVENOUS EVERY 6 HOURS
Status: DISCONTINUED | OUTPATIENT
Start: 2017-08-14 | End: 2017-08-15 | Stop reason: HOSPADM

## 2017-08-14 RX ORDER — MIDAZOLAM HYDROCHLORIDE 1 MG/ML
INJECTION, SOLUTION INTRAMUSCULAR; INTRAVENOUS AS NEEDED
Status: DISCONTINUED | OUTPATIENT
Start: 2017-08-14 | End: 2017-08-14 | Stop reason: HOSPADM

## 2017-08-14 RX ORDER — LIDOCAINE HYDROCHLORIDE 10 MG/ML
0.1 INJECTION, SOLUTION EPIDURAL; INFILTRATION; INTRACAUDAL; PERINEURAL AS NEEDED
Status: DISCONTINUED | OUTPATIENT
Start: 2017-08-14 | End: 2017-08-14 | Stop reason: HOSPADM

## 2017-08-14 RX ADMIN — CEFAZOLIN 2 G: 1 INJECTION, POWDER, FOR SOLUTION INTRAMUSCULAR; INTRAVENOUS; PARENTERAL at 15:48

## 2017-08-14 RX ADMIN — DEXMEDETOMIDINE HYDROCHLORIDE 10 MCG: 4 INJECTION, SOLUTION INTRAVENOUS at 08:53

## 2017-08-14 RX ADMIN — ROPIVACAINE HYDROCHLORIDE 25 ML: 5 INJECTION, SOLUTION EPIDURAL; INFILTRATION; PERINEURAL at 07:13

## 2017-08-14 RX ADMIN — DEXMEDETOMIDINE HYDROCHLORIDE 10 MCG: 4 INJECTION, SOLUTION INTRAVENOUS at 07:24

## 2017-08-14 RX ADMIN — ACETAMINOPHEN 650 MG: 325 TABLET, FILM COATED ORAL at 19:05

## 2017-08-14 RX ADMIN — DEXAMETHASONE SODIUM PHOSPHATE 10 MG: 10 INJECTION, SOLUTION INTRAMUSCULAR; INTRAVENOUS at 07:43

## 2017-08-14 RX ADMIN — KETOROLAC TROMETHAMINE 15 MG: 30 INJECTION, SOLUTION INTRAMUSCULAR at 23:19

## 2017-08-14 RX ADMIN — SODIUM CHLORIDE 125 ML/HR: 900 INJECTION, SOLUTION INTRAVENOUS at 10:17

## 2017-08-14 RX ADMIN — SODIUM CHLORIDE, SODIUM LACTATE, POTASSIUM CHLORIDE, CALCIUM CHLORIDE: 600; 310; 30; 20 INJECTION, SOLUTION INTRAVENOUS at 07:52

## 2017-08-14 RX ADMIN — CARVEDILOL 12.5 MG: 12.5 TABLET, FILM COATED ORAL at 16:59

## 2017-08-14 RX ADMIN — DOXAZOSIN 2 MG: 2 TABLET ORAL at 21:54

## 2017-08-14 RX ADMIN — BUPIVACAINE HYDROCHLORIDE 15 MG: 5 INJECTION, SOLUTION EPIDURAL; INTRACAUDAL at 07:38

## 2017-08-14 RX ADMIN — TRAMADOL HYDROCHLORIDE 50 MG: 50 TABLET, FILM COATED ORAL at 19:05

## 2017-08-14 RX ADMIN — ASPIRIN 325 MG: 325 TABLET, DELAYED RELEASE ORAL at 13:08

## 2017-08-14 RX ADMIN — CELECOXIB 200 MG: 200 CAPSULE ORAL at 06:45

## 2017-08-14 RX ADMIN — FAMOTIDINE 20 MG: 20 TABLET ORAL at 17:01

## 2017-08-14 RX ADMIN — MIDAZOLAM HYDROCHLORIDE 2 MG: 1 INJECTION, SOLUTION INTRAMUSCULAR; INTRAVENOUS at 07:07

## 2017-08-14 RX ADMIN — KETOROLAC TROMETHAMINE 15 MG: 30 INJECTION, SOLUTION INTRAMUSCULAR at 17:01

## 2017-08-14 RX ADMIN — ONDANSETRON 4 MG: 2 INJECTION INTRAMUSCULAR; INTRAVENOUS at 07:43

## 2017-08-14 RX ADMIN — CEFAZOLIN 2 G: 1 INJECTION, POWDER, FOR SOLUTION INTRAMUSCULAR; INTRAVENOUS; PARENTERAL at 23:16

## 2017-08-14 RX ADMIN — PREGABALIN 75 MG: 75 CAPSULE ORAL at 06:45

## 2017-08-14 RX ADMIN — POLYETHYLENE GLYCOL 3350 17 G: 17 POWDER, FOR SOLUTION ORAL at 17:01

## 2017-08-14 RX ADMIN — ACETAMINOPHEN 650 MG: 325 TABLET, FILM COATED ORAL at 13:08

## 2017-08-14 RX ADMIN — TRAMADOL HYDROCHLORIDE 50 MG: 50 TABLET, FILM COATED ORAL at 13:08

## 2017-08-14 RX ADMIN — DOCUSATE SODIUM AND SENNOSIDES 1 TABLET: 8.6; 5 TABLET, FILM COATED ORAL at 17:01

## 2017-08-14 RX ADMIN — PROPOFOL 35 MCG/KG/MIN: 10 INJECTION, EMULSION INTRAVENOUS at 07:31

## 2017-08-14 RX ADMIN — DEXMEDETOMIDINE HYDROCHLORIDE 10 MCG: 4 INJECTION, SOLUTION INTRAVENOUS at 07:38

## 2017-08-14 RX ADMIN — DEXMEDETOMIDINE HYDROCHLORIDE 10 MCG: 4 INJECTION, SOLUTION INTRAVENOUS at 07:32

## 2017-08-14 RX ADMIN — CEFAZOLIN 2 G: 1 INJECTION, POWDER, FOR SOLUTION INTRAMUSCULAR; INTRAVENOUS; PARENTERAL at 07:31

## 2017-08-14 RX ADMIN — ASPIRIN 325 MG: 325 TABLET, DELAYED RELEASE ORAL at 17:01

## 2017-08-14 RX ADMIN — MIDAZOLAM HYDROCHLORIDE 2 MG: 1 INJECTION, SOLUTION INTRAMUSCULAR; INTRAVENOUS at 07:24

## 2017-08-14 RX ADMIN — ACETAMINOPHEN 1000 MG: 500 TABLET, FILM COATED ORAL at 06:45

## 2017-08-14 RX ADMIN — TRAMADOL HYDROCHLORIDE 50 MG: 50 TABLET, FILM COATED ORAL at 23:16

## 2017-08-14 RX ADMIN — SODIUM CHLORIDE, SODIUM LACTATE, POTASSIUM CHLORIDE, AND CALCIUM CHLORIDE 25 ML/HR: 600; 310; 30; 20 INJECTION, SOLUTION INTRAVENOUS at 06:40

## 2017-08-14 NOTE — PROGRESS NOTES
TRANSFER - IN REPORT:    Verbal report received from Fern(name) on Damaris Villagran  being received from [employer]) for routine post - op      Report consisted of patients Situation, Background, Assessment and   Recommendations(SBAR). Information from the following report(s) SBAR and Kardex was reviewed with the receiving nurse. Opportunity for questions and clarification was provided. Assessment completed upon patients arrival to unit and care assumed.

## 2017-08-14 NOTE — PERIOP NOTES
Right adductor canal block performed by Dr. Cj Watkins. Oxygen and monitors in place per protocol. Patient tolerated procedure well.

## 2017-08-14 NOTE — ANESTHESIA PREPROCEDURE EVALUATION
Anesthetic History   No history of anesthetic complications            Review of Systems / Medical History  Patient summary reviewed, nursing notes reviewed and pertinent labs reviewed    Pulmonary  Within defined limits                 Neuro/Psych   Within defined limits           Cardiovascular    Hypertension: well controlled              Exercise tolerance: >4 METS     GI/Hepatic/Renal     GERD: well controlled           Endo/Other        Arthritis     Other Findings              Physical Exam    Airway  Mallampati: II  TM Distance: > 6 cm  Neck ROM: normal range of motion   Mouth opening: Normal     Cardiovascular  Regular rate and rhythm,  S1 and S2 normal,  no murmur, click, rub, or gallop             Dental    Dentition: Full upper dentures     Pulmonary  Breath sounds clear to auscultation               Abdominal  GI exam deferred       Other Findings            Anesthetic Plan    ASA: 2  Anesthesia type: regional and spinal - saphenous block      Post-op pain plan if not by surgeon: peripheral nerve block single    Induction: Intravenous  Anesthetic plan and risks discussed with: Patient

## 2017-08-14 NOTE — IP AVS SNAPSHOT
[facility] [address] [place] [address] 
[phone] Patient: Darryl Cha MRN: [id number] NNU:7/64/7785 You are allergic to the following Allergen Reactions Oxycodone Other (comments) Keeps him away, \"hyper\" Recent Documentation Height Weight BMI Smoking Status 1.778 m 82.1 kg 25.97 kg/m2 Never Smoker Emergency Contacts Name Discharge Info Relation Home Work Mobile SonuJennie rolandnda DISCHARGE CAREGIVER [3] Spouse [3] [phone] About your hospitalization You were admitted on:  August 14, 2017 [facility] on:  August 15, 2017 Unit phone number:  [phone] Why you were hospitalized Your primary diagnosis was:  Not on File Your diagnoses also included:  Osteoarthritis Of Right Knee Providers Seen During Your Hospitalizations Provider Role Specialty Primary office phone Ernst Main MD Attending Provider Orthopedic Surgery [phone] Your Primary Care Physician (PCP) Primary Care Physician Office Phone Office Fax [place] [facility] [address] [phone] Follow-up Information Follow up With Details Comments Contact Info Muna Yanez MD   [place] [address] [address] [place] [address] 
[phone] AT [place] [address] 
[phone] Current Discharge Medication List  
  
START taking these medications Dose & Instructions Dispensing Information Comments Morning Noon Evening Bedtime  
 aspirin delayed-release 325 mg tablet Replaces:  aspirin 81 mg tablet Your last dose was: Your next dose is:    
   
   
 Dose:  325 mg Take 1 Tab by mouth two (2) times a day. Quantity:  60 Tab Refills:  0  
     
   
   
   
  
 diclofenac EC 50 mg EC tablet Commonly known as:  VOLTAREN Your last dose was: Your next dose is:    
   
   
 Dose:  50 mg Take 1 Tab by mouth two (2) times a day. Quantity:  42 Tab Refills:  0 HYDROmorphone 2 mg tablet Commonly known as:  DILAUDID Your last dose was: Your next dose is:    
   
   
 Dose:  2 mg Take 1 Tab by mouth every four (4) hours as needed for Pain. Max Daily Amount: 12 mg. Quantity:  80 Tab Refills:  0  
     
   
   
   
  
 traMADol 50 mg tablet Commonly known as:  ULTRAM  
   
Your last dose was: Your next dose is:    
   
   
 Dose:  50 mg Take 1 Tab by mouth every six (6) hours. Max Daily Amount: 200 mg. Quantity:  80 Tab Refills:  0 CONTINUE these medications which have NOT CHANGED Dose & Instructions Dispensing Information Comments Morning Noon Evening Bedtime  
 amLODIPine 10 mg tablet Commonly known as:  David Lute Your last dose was: Your next dose is:    
   
   
 Dose:  10 mg Take 10 mg by mouth daily. Refills:  0  
     
   
   
   
  
 calcium carbonate 600 mg calcium (1,500 mg) tablet Commonly known as:  Kathy Baca Your last dose was: Your next dose is:    
   
   
 Dose:  600 mg Take 600 mg by mouth daily. Refills:  0  
     
   
   
   
  
 carvedilol 12.5 mg tablet Commonly known as:  Juany Ready Your last dose was: Your next dose is:    
   
   
 Dose:  12.5 mg Take 12.5 mg by mouth two (2) times daily (with meals). Refills:  0 CENTRUM SILVER PO Your last dose was: Your next dose is: Take  by mouth. Takes one po daily. Refills:  0  
     
   
   
   
  
 doxazosin 2 mg tablet Commonly known as:  CARDURA Your last dose was: Your next dose is:    
   
   
 Dose:  2 mg Take 2 mg by mouth nightly.   
 Refills:  0  
     
   
   
   
  
 FISH OIL PO  
   
 Your last dose was: Your next dose is:    
   
   
 Dose:  1200 mg Take 1,200 mg by mouth daily. Refills:  0 GARLIC PO Your last dose was: Your next dose is:    
   
   
 Dose:  1 Cap Take 1 Cap by mouth daily. 1000 MG Refills:  0 Iron 325 mg (65 mg iron) tablet Generic drug:  ferrous sulfate Your last dose was: Your next dose is:    
   
   
 Dose:  65 mg Take 65 mg by mouth Daily (before breakfast). Refills:  0  
     
   
   
   
  
 losartan 100 mg tablet Commonly known as:  COZAAR Your last dose was: Your next dose is:    
   
   
 Dose:  100 mg Take 100 mg by mouth daily. Refills:  0  
     
   
   
   
  
 VITAMIN D3 1,000 unit tablet Generic drug:  cholecalciferol Your last dose was: Your next dose is:    
   
   
 Dose:  1000 Units Take 1,000 Units by mouth daily. Refills:  0 STOP taking these medications   
 aspirin 81 mg tablet Replaced by:  aspirin delayed-release 325 mg tablet Where to Get Your Medications Information on where to get these meds will be given to you by the nurse or doctor. ! Ask your nurse or doctor about these medications  
  aspirin delayed-release 325 mg tablet  
 diclofenac EC 50 mg EC tablet HYDROmorphone 2 mg tablet  
 traMADol 50 mg tablet Discharge Instructions After [facility] Home Discharge Instructions Partial Knee Replacement-Dr. Alice Huerta Patient Name: Nate Delgado Date of procedure: 8/14/2017 Procedure: Procedure(s): RIGHT UNICOMPARTMENTAL KNEE ARTHROPLASTY Surgeon: Surgeon(s) and Role: Franko Mack MD - Primary PCP: Corazon Erazo MD 
Date of discharge: No discharge date for patient encounter. Follow up appointments 
-follow up with Dr. Alice Huerta in 4 weeks. Call [phone] to make an appointment. Activity 
-weight bearing as tolerated with walker or crutches Incision Care - The Aquacel (brown, waterproof) surgical dressing is to remain on the knee for 7 days. On the 7th day gently peel the dressing off by carefully lifting the edge and stretching it slightly to break the adhesive seal and leave incision open to air. Patient may take a shower with the Aquacel dressing in place. Preventing blood clots - Please take 325 mg aspirin twice daily. Continue for one month following surgery. Pain management - Please take scheduled 650 mg tylenol every 6 hours for 4 weeks - Please take scheduled diclofenac 50 mg twice daily for 4 weeks - Please take tramadol every 6 hours for pain as needed for pain. - For breakthrough please take 2 mg dilaudid - Avoid alcoholic beverages while taking pain medication - Do not take any over-the-counter medication for pain except Tylenol (acetaminophen) - Please be aware that many medications contain Tylenol. We do not want you to over medicate so please read the information below as a guide. Do not take more than 4 Grams of Tylenol in a 24 hour - You may place an ice bag on your knee for 15-20 minutes after exercising and as needed throughout the day and night Diet 
-resume usual diet; encourage fluids; provide foods high in fiber 
-provide stool softeners/laxatives as needed Home Health Nursing 
-remove Aquacel dressing at 7 days post-op  
-complete head to toe assessment, vital signs 
-medication reconciliation 
-review pain management 
-manage chronic medical conditions Weight bearing status: 
Precautions at Admission: WBAT As tolerated Right Side Weight Bearing: As tolerated Mobility Status: 
Supine to Sit: Modified independent Sit to Stand: Modified independent Sit to Supine: Modified independent Bed to Chair: Modified independent Gait: 
Distance (ft): 200 Feet (ft) Ambulation - Level of Assistance: Supervision Assistive Device: Gait belt, Crutches ADL status overall composite: Toilet Transfer : Modified independent Physical Therapy Phase one: 
-assessment and evaluation-bed mobility; functional transfers (bed, chair, bathroom, stairs); ambulation with equipment, safety and ability to get out of the facility in the event of an emergency 
-review and maintain weight bearing as tolerated 
-discuss pain management 
-review how to do ADLs 
 
- Exercise Program-refer to pages 32-40 of the patient handbook for exercises 
-supine exercises-ankle pumps, hamstring sets, quad sets, heel slides, short arc quad sets, long arc quads-prop heel on pillow for stretch, straight leg raise 
-sitting exercises-active knee flexion, passive knee flexion, active knee extension, ankle pumps, bicep curls (use weights as appropriate), triceps pushups, shoulder flexion (use weights as appropriate) -standing exercises holding onto supportive counter-toe raises, heel raises, mini-squats, heel/toe touches with knee bend, marching in place, hamstring curls Phase two: 
-bed mobility, functional transfers, gait training-wean from walker/crutches as tolerated 
-stair climbing, shower transfers, car transfers, increase independence with ADLs 
 
-Exercise Program-refer to pages 32-40 of the patient handbook 
 -continue with supine, sitting and standing exercises as above Physical therapy goals by discharge from [place] [address] ambulation with walker, crutches or cane if needed (level surfaces and   stairs) -Flexion > 90 degrees, extension 0 degrees 
-Daily performance of exercise program 
-Independent with stair climbing and car transfers 
-Progress to community ambulator (least restrictive assistive device) Discharge Orders None Introducing \[facility] [place] [facility]\"" HEALTH SERVICES!    
 Donna Ga introduces [facility] patient portal. Now you can access parts of your medical record, email your doctor's office, and request medication refills online. 1. In your internet browser, go to [email or web address] 2. Click on the First Time User? Click Here link in the Sign In box. You will see the New Member Sign Up page. 3. Enter your Z[employer] Access Code exactly as it appears below. You will not need to use this code after youve completed the sign-up process. If you do not sign up before the expiration date, you must request a new code. · Z[employer] Access Code: [email or web address] Expires: 8/18/2017 12:17 AM 
 
4. Enter the last four digits of your Social Security Number (xxxx) and Date of Birth (mm/dd/yyyy) as indicated and click Submit. You will be taken to the next sign-up page. 5. Create a ZAOZAO ID. This will be your ZAOZAO login ID and cannot be changed, so think of one that is secure and easy to remember. 6. Create a ZAOZAO password. You can change your password at any time. 7. Enter your Password Reset Question and Answer. This can be used at a later time if you forget your password. 8. Enter your e-mail address. You will receive e-mail notification when new information is available in [address] 9. Click Sign Up. You can now view and download portions of your medical record. 10. Click the Download Summary menu link to download a portable copy of your medical information. If you have questions, please visit the Frequently Asked Questions section of the [employer] website. Remember, ZAOZAO is NOT to be used for urgent needs. For medical emergencies, dial 911. Now available from your iPhone and Android! General Information Please provide this summary of care documentation to your next provider. Patient Signature:  ____________________________________________________________ Date:  ____________________________________________________________  
  
Claudene Born Provider Signature:  ____________________________________________________________ Date:  ____________________________________________________________

## 2017-08-14 NOTE — PROGRESS NOTES
Problem: Self Care Deficits Care Plan (Adult)  Goal: *Acute Goals and Plan of Care (Insert Text)  OCCUPATIONAL THERAPY EVALUATION/DISCHARGE  Patient: Keshawn Cordero (33 y.o. male)  Date: 8/14/2017  Primary Diagnosis: LOCALIZED OSTEO ARTHRITIS OF RIGHT KNEE  Osteoarthritis of right knee  Osteoarthritis of right knee  Procedure(s) (LRB):  RIGHT UNICOMPARTMENTAL KNEE ARTHROPLASTY (Right) Day of Surgery   Precautions:   WBAT      ASSESSMENT:   Based on the objective data described below, the patient presents with good activity tolerance however has not sat in chair this date, patient overall supervision to independent with basic ADL's and ADL mobility using crutches. On target for discharge this date with. Further skilled acute occupational therapy is not indicated at this time. Discharge Recommendations: None  Further Equipment Recommendations for Discharge: none        SUBJECTIVE:   Patient stated I want to make sure my pain is under control.  (before he goes home)      OBJECTIVE DATA SUMMARY:   HISTORY:   Past Medical History:   Diagnosis Date    Arthritis       thumbs    GERD (gastroesophageal reflux disease)       hx of    Hypertension      Other ill-defined conditions       bilateral carpal tunnel     Past Surgical History:   Procedure Laterality Date    HX APPENDECTOMY        HX KNEE ARTHROSCOPY Right       for torn menicus    HX ORTHOPAEDIC         back    HX OTHER SURGICAL         lipoma removed    HX SEPTOPLASTY            Prior Level of Function/Home Situation: independent, lives with his wife, reports increased pain in left LE and possible need for cortisone shot  Expanded or extensive additional review of patient history:      Home Situation  Home Environment: Private residence  # Steps to Enter: 1  Rails to Enter: No  One/Two Story Residence: Split level  Living Alone: No  Support Systems: Family member(s)  Patient Expects to be Discharged to[de-identified] Private residence  Current DME Used/Available at Home: Crutches  [ ]  Right hand dominant   [ ]  Left hand dominant     EXAMINATION OF PERFORMANCE DEFICITS:  Cognitive/Behavioral Status:  Neurologic State: Alert  Orientation Level: Oriented X4  Cognition: Follows commands; Appropriate safety awareness; Appropriate for age attention/concentration; Appropriate decision making  Perception: Appears intact  Perseveration: No perseveration noted  Safety/Judgement: Awareness of environment; Fall prevention;Home safety     Skin: right LE with ACE wrap     Edema:  right LE     Hearing: Auditory  Auditory Impairment: None     Vision/Perceptual:                                Corrective Lenses: Glasses     Range of Motion:  AROM: Within functional limits  PROM: Within functional limits                       Strength:  Strength: Within functional limits                 Coordination:  Coordination: Within functional limits             Tone & Sensation:  Tone: Normal  Sensation: Intact                       Balance:  Sitting: Intact  Standing: Intact     Functional Mobility and Transfers for ADLs:  Bed Mobility:  Rolling: Modified independent  Supine to Sit: Modified independent  Sit to Supine: Modified independent  Scooting: Independent     Transfers:  Sit to Stand: Modified independent  Stand to Sit: Modified independent  Bed to Chair: Modified independent  Toilet Transfer : Modified independent     ADL Assessment:  Feeding: Independent     Oral Facial Hygiene/Grooming: Modified Independent     Bathing: Supervision     Upper Body Dressing: Independent     Lower Body Dressing: Modified independent     Toileting: Modified independent                 ADL Intervention and task modifications: Instructed on positioning in supine, benefit of increased ROM and mobility,need to be out of bed and moving around. At this time due to IV call for assist        Cognitive Retraining  Safety/Judgement: Awareness of environment; Fall prevention;Home safety     Dressing joint: Patient instructed to don/doff rightLE first/last.  Patient instructed to don all clothing while sitting prior to standing, doff all clothing to knees while standing, then sit to doff clothing off from knees to feet in order to facilitate fall prevention, pain management, and energy conservation. Patient indicated understanding. Home safety: Patient instructed on home modifications and safety (raise height of ADL objects, appropriate height of chair surfaces, recliner safety, change of floor surfaces, clear pathways) to increase independence and fall prevention of right LE. Patient indicated understanding. Standing: Patient instructed to walk up to sink/counter top/surfaces, step into walker to increase safety of joint and fall prevention. Patient educated about knee anatomy verbally and with pictures and educated to avoid rotation of right LE. Instructed to apply concept to ADLs within the home (no twisting of knee during reaching across body, square off while using objects, slide objects along surfaces). Patient instructed to increase amount of time standing, observe standing position during ADLs in order to increase even weight bearing through bilateral LEs in order to increase independence with ADLs. Goal to be reached 30 days post - op, per orthopedic surgeon or per PT. Patient indicated understanding. Therapeutic Exercise: Instructed in performance of AAROM right LE knee flexion and extension seated in chair   Functional Measure:  Barthel Index:      Bathin  Bladder: 10  Bowels: 10  Groomin  Dressing: 10  Feeding: 10  Mobility: 15  Stairs: 5  Toilet Use: 10  Transfer (Bed to Chair and Back): 15  Total: 90         Barthel and G-code impairment scale:  Percentage of impairment CH  0% CI  1-19% CJ  20-39% CK  40-59% CL  60-79% CM  80-99% CN  100%   Barthel Score 0-100 100 99-80 79-60 59-40 20-39 1-19    0   Barthel Score 0-20 20 17-19 13-16 9-12 5-8 1-4 0      The Barthel ADL Index: Guidelines  1. The index should be used as a record of what a patient does, not as a record of what a patient could do. 2. The main aim is to establish degree of independence from any help, physical or verbal, however minor and for whatever reason. 3. The need for supervision renders the patient not independent. 4. A patient's performance should be established using the best available evidence. Asking the patient, friends/relatives and nurses are the usual sources, but direct observation and common sense are also important. However direct testing is not needed. 5. Usually the patient's performance over the preceding 24-48 hours is important, but occasionally longer periods will be relevant. 6. Middle categories imply that the patient supplies over 50 per cent of the effort. 7. Use of aids to be independent is allowed. Estela Kay., Barthel, D.W. (2567). Functional evaluation: the Barthel Index. [address] [facility] (14)2. Lisa Brice eliana MARILYN Smith, Laurie Chowdary., Yuli Cosme., [facility], [address] [place] [address] (1999). Measuring the change indisability after inpatient rehabilitation; comparison of the responsiveness of the Barthel Index and Functional [place] Measure. Journal of Neurology, Neurosurgery, and Psychiatry, 66(4), 724-415. Neo Jordan, N.J.A, Adriana Hammer  W.J.M, & Aubrey Leo MJAYA. (2004.) Assessment of post-stroke quality of life in cost-effectiveness studies: The usefulness of the Barthel Index and the EuroQoL-5D. Quality of Life Research, 13, 171-84            G codes: In compliance with CMSs Claims Based Outcome Reporting, the following G-code set was chosen for this patient based on their primary functional limitation being treated: The outcome measure chosen to determine the severity of the functional limitation was the Barthel Index with a score of 90/100 which was correlated with the impairment scale.       · Self Care:               - CURRENT STATUS:    CI - 1%-19% impaired, limited or restricted  - GOAL STATUS:           CI - 1%-19% impaired, limited or restricted               - D/C STATUS:                       CI - 1%-19% impaired, limited or restricted      Occupational Therapy Evaluation Charge Determination   History Examination Decision-Making   LOW Complexity : Brief history review  LOW Complexity : 1-3 performance deficits relating to physical, cognitive , or psychosocial skils that result in activity limitations and / or participation restrictions  LOW Complexity : No comorbidities that affect functional and no verbal or physical assistance needed to complete eval tasks       Based on the above components, the patient evaluation is determined to be of the following complexity level: LOW   Pain:     No complaint     Activity Tolerance:   Good   Please refer to the flowsheet for vital signs taken during this treatment. After treatment:   [X]  Patient left in no apparent distress sitting up in chair  [ ]  Patient left in no apparent distress in bed  [X]  Call bell left within reach  [X]  Nursing notified  [ ]  Caregiver present  [ ]  Bed alarm activated      COMMUNICATION/EDUCATION:   Communication/Collaboration:  [X]      Home safety education was provided and the patient/caregiver indicated understanding. [X]      Patient/family have participated as able and agree with findings and recommendations. [ ]      Patient is unable to participate in plan of care at this time.   Findings and recommendations were discussed with: Physical Therapist and Registered Nurse     LIO Reza/L  Time Calculation: 20 mins

## 2017-08-14 NOTE — ANESTHESIA POSTPROCEDURE EVALUATION
Post-Anesthesia Evaluation and Assessment    Patient: Brent Engel MRN: [id number]  SSN: [id number]    YOB: 1945  Age: 71 y.o. Sex: male       Cardiovascular Function/Vital Signs  Visit Vitals    /62    Pulse (!) 55    Temp 36.4 °C (97.6 °F)    Resp 14    Ht 5' 10\" (1.778 m)    Wt 82.1 kg (181 lb)    SpO2 99%    BMI 25.97 kg/m2       Patient is status post regional, spinal anesthesia for Procedure(s):  RIGHT UNICOMPARTMENTAL KNEE ARTHROPLASTY. Nausea/Vomiting: None    Postoperative hydration reviewed and adequate. Pain:  Pain Scale 1: Numeric (0 - 10) (08/14/17 1011)  Pain Intensity 1: 0 (08/14/17 1011)   Managed    Neurological Status:   Neuro (WDL): Within Defined Limits (08/14/17 1011)  Neuro  Neurologic State: Sleeping (08/14/17 1011)  LUE Motor Response: Purposeful (08/14/17 1011)  LLE Motor Response: Purposeful (08/14/17 1011)  RUE Motor Response: Purposeful (08/14/17 1011)  RLE Motor Response: Purposeful (08/14/17 1011)   At baseline    Mental Status and Level of Consciousness: Arousable    Pulmonary Status:   O2 Device: Room air (08/14/17 1018)   Adequate oxygenation and airway patent    Complications related to anesthesia: None    Post-anesthesia assessment completed.  No concerns    Signed By: Jordan Paris DO     August 14, 2017

## 2017-08-14 NOTE — ANESTHESIA PROCEDURE NOTES
Spinal Block    Start time: 8/14/2017 7:30 AM  End time: 8/14/2017 7:40 AM  Performed by: Radha Mason by: Mansi Contreras     Pre-procedure:   Indications: at surgeon's request and primary anesthetic  Preanesthetic Checklist: patient identified, risks and benefits discussed, anesthesia consent, site marked, patient being monitored and timeout performed      Spinal Block:   Patient Position:  Seated    Prep: Betadine      Location:  L3-4  Technique:  Single shot        Needle:   Needle Type:  Pencil-tip  Needle Gauge:  25 G  Attempts:  1      Events: CSF confirmed, no blood with aspiration and no paresthesia        Assessment:  Insertion:  Uncomplicated  Patient tolerance:  Patient tolerated the procedure well with no immediate complications

## 2017-08-14 NOTE — PERIOP NOTES
TRANSFER - OUT REPORT:    Verbal report given to Bibi Reyes RN(name) on Dorcas [place]per  being transferred to (unit) for routine post - op       Report consisted of patients Situation, Background, Assessment and   Recommendations(SBAR). Time Pre op antibiotic given:0731  Anesthesia Stop time: 5855  Fontanez Present on Transfer to [address] for Fontanez on Chart:No    Information from the following report(s) SBAR, OR Summary, Intake/Output, MAR, Recent Results and Med Rec Status was reviewed with the receiving nurse. Opportunity for questions and clarification was provided. Is the patient on 02? NO       L/Min        Other     Is the patient on a monitor? NO    Is the nurse transporting with the patient? NO    Surgical Waiting Area notified of patient's transfer from PACU? YES      The following personal items collected during your admission accompanied patient upon transfer:   Dental Appliance:    Vision:    Hearing Aid:    Jewelry:    Clothing: Clothing:  (bag of clothes, dentures and glasses returned in PACU)  Other Valuables:  Other Valuables: Eyeglasses, Other (comment) (glasses and dentures to pacu)  Valuables sent to safe:

## 2017-08-14 NOTE — PROGRESS NOTES
physical Therapy EVALUATION/DISCHARGE  Patient: Pat Haji (29 y.o. male)  Date: 8/14/2017  Primary Diagnosis: LOCALIZED OSTEO ARTHRITIS OF RIGHT KNEE  Osteoarthritis of right knee  Osteoarthritis of right knee  Procedure(s) (LRB):  RIGHT UNICOMPARTMENTAL KNEE ARTHROPLASTY (Right) Day of Surgery   Precautions: WBAT     ASSESSMENT :  Based on the objective data described below, the patient presents with overall supervision to modified independent for mobility. He presents with minimal pain and able to manage bed mobility without assist.  Assessed first with walker and did well therefore assessed with axillary crutches. Ambulated safely in the martinez and up/down 4 steps with supervision with steady gait. Educated on ankle pumps, quads set, heel slides, knee extension and SLR. Managed well. Returned to bed without complaint. Nurse aware of elevated blood pressure. Patient is cleared for discharge from PT standpoint:  YES [x]     NO []   . Skilled physical therapy is not indicated at this time. PLAN :  Discharge Recommendations: Home Health  Further Equipment Recommendations for Discharge: crutches     SUBJECTIVE:   Patient stated I was planning on spending the night. Andrew Leaks    OBJECTIVE DATA SUMMARY:   HISTORY:    Past Medical History:   Diagnosis Date    Arthritis     thumbs    GERD (gastroesophageal reflux disease)     hx of    Hypertension     Other ill-defined conditions     bilateral carpal tunnel     Past Surgical History:   Procedure Laterality Date    HX APPENDECTOMY      HX KNEE ARTHROSCOPY Right     for torn menicus    HX ORTHOPAEDIC      back    HX OTHER SURGICAL      lipoma removed    HX SEPTOPLASTY       Prior Level of Function/Home Situation: independent  Personal factors and/or comorbidities impacting plan of care:     Home Situation  Home Environment: Private residence  # Steps to Enter: 1  Rails to Enter: No  One/Two Story Residence: Split level  Living Alone: No  Support Systems: Family member(s)  Patient Expects to be Discharged to[de-identified] Private residence  Current DME Used/Available at Home: Crutches    EXAMINATION/PRESENTATION/DECISION MAKING:   Critical Behavior:  Neurologic State: Sleeping           Hearing: Auditory  Auditory Impairment: None  Skin:  Dressing intact  Edema: none  Range Of Motion:  AROM: Within functional limits           PROM: Within functional limits           Strength:    Strength:  Within functional limits                    Tone & Sensation:   Tone: Normal              Sensation: Intact               Coordination:  Coordination: Within functional limits  Vision:      Functional Mobility:  Bed Mobility:     Supine to Sit: Modified independent  Sit to Supine: Modified independent  Scooting: Independent  Transfers:  Sit to Stand: Modified independent  Stand to Sit: Modified independent                       Balance:   Sitting: Intact  Standing: Intact  Ambulation/Gait Training:  Distance (ft): 200 Feet (ft)  Assistive Device: Gait belt;Crutches  Ambulation - Level of Assistance: Supervision     Gait Description (WDL): Exceptions to WDL     Right Side Weight Bearing: As tolerated           Speed/Valentine: Slow                            Stairs:  Number of Stairs Trained: 4  Stairs - Level of Assistance: Supervision   Rail Use: Left      Therapeutic Exercises:   Educated on ankle pumps, heel slides, LAQ, SLR    Functional Measure:  Tinetti test:    Sitting Balance: 1  Arises: 1  Attempts to Rise: 2  Immediate Standing Balance: 2  Standing Balance: 2  Nudged: 2  Eyes Closed: 1  Turn 360 Degrees - Continuous/Discontinuous: 1  Turn 360 Degrees - Steady/Unsteady: 1  Sitting Down: 2  Balance Score: 15  Indication of Gait: 1  R Step Length/Height: 1  L Step Length/Height: 1  R Foot Clearance: 1  L Foot Clearance: 1  Step Symmetry: 1  Step Continuity: 1  Path: 1  Trunk: 0  Walking Time: 1  Gait Score: 9  Total Score: 24       Tinetti Test and G-code impairment scale:  Percentage of Impairment CH    0%   CI    1-19% CJ    20-39% CK    40-59% CL    60-79% CM    80-99% CN     100%   Tinetti  Score 0-28 28 23-27 17-22 12-16 6-11 1-5 0       Tinetti Tool Score Risk of Falls  <19 = High Fall Risk  19-24 = Moderate Fall Risk  25-28 = Low Fall Risk  Tinetti ME. Performance-Oriented Assessment of Mobility Problems in Elderly Patients. Pantoja [address]; [id number]. (Scoring Description: PT Bulletin Feb. 10, 1993)    Older adults: Levar Dena et al, 2009; n = 1000 [facility] elderly evaluated with ABC, RU, ADL, and IADL)  · Mean RU score for males aged 69-68 years = 26.21(3.40)  · Mean RU score for females age 69-68 years = 25.16(4.30)  · Mean RU score for males over 80 years = 23.29(6.02)  · Mean RU score for females over 80 years = 17.20(8.32)         G codes: In compliance with CMSs Claims Based Outcome Reporting, the following G-code set was chosen for this patient based on their primary functional limitation being treated: The outcome measure chosen to determine the severity of the functional limitation was the Tinette with a score of 24/28 which was correlated with the impairment scale. ? Mobility - Walking and Moving Around:     - CURRENT STATUS: CI - 1%-19% impaired, limited or restricted    - GOAL STATUS: CI - 1%-19% impaired, limited or restricted    - D/C STATUS:  CI - 1%-19% impaired, limited or restricted        Pain:Pain Scale 1: Numeric (0 - 10)  Pain Intensity 1: 0  Pain Location 1: Knee  Activity Tolerance:   Elevated BP  Please refer to the flowsheet for vital signs taken during this treatment.   After treatment:   []   Patient left in no apparent distress sitting up in chair  [x]   Patient left in no apparent distress in bed  [x]   Call bell left within reach  [x]   Nursing notified  []   Caregiver present  []   Bed alarm activated    COMMUNICATION/EDUCATION:   Communication/Collaboration:  []   Fall prevention education was provided and the patient/caregiver indicated understanding. [x]   Patient/family have participated as able and agree with findings and recommendations. []   Patient is unable to participate in plan of care at this time.   Findings and recommendations were discussed with: Registered Nurse and Physician    Thank you for this referral.  Maia Hernandez, PT   Time Calculation: 36 mins

## 2017-08-14 NOTE — IP AVS SNAPSHOT
[address] 
[phone] Patient: Darryl Cha MRN: [id number] IRS:[id number] Current Discharge Medication List  
  
START taking these medications Dose & Instructions Dispensing Information Comments Morning Noon Evening Bedtime  
 aspirin delayed-release 325 mg tablet Replaces:  aspirin 81 mg tablet Your last dose was: Your next dose is:    
   
   
 Dose:  325 mg Take 1 Tab by mouth two (2) times a day. Quantity:  60 Tab Refills:  0  
     
   
   
   
  
 diclofenac EC 50 mg EC tablet Commonly known as:  VOLTAREN Your last dose was: Your next dose is:    
   
   
 Dose:  50 mg Take 1 Tab by mouth two (2) times a day. Quantity:  42 Tab Refills:  0 HYDROmorphone 2 mg tablet Commonly known as:  DILAUDID Your last dose was: Your next dose is:    
   
   
 Dose:  2 mg Take 1 Tab by mouth every four (4) hours as needed for Pain. Max Daily Amount: 12 mg. Quantity:  80 Tab Refills:  0  
     
   
   
   
  
 traMADol 50 mg tablet Commonly known as:  ULTRAM  
   
Your last dose was: Your next dose is:    
   
   
 Dose:  50 mg Take 1 Tab by mouth every six (6) hours. Max Daily Amount: 200 mg. Quantity:  80 Tab Refills:  0 CONTINUE these medications which have NOT CHANGED Dose & Instructions Dispensing Information Comments Morning Noon Evening Bedtime  
 amLODIPine 10 mg tablet Commonly known as:  Tunde Garcia Your last dose was: Your next dose is:    
   
   
 Dose:  10 mg Take 10 mg by mouth daily. Refills:  0  
     
   
   
   
  
 calcium carbonate 600 mg calcium (1,500 mg) tablet Commonly known as:  Derrick Arreola Your last dose was: Your next dose is:    
   
   
 Dose:  600 mg Take 600 mg by mouth daily. Refills:  0 carvedilol 12.5 mg tablet Commonly known as:  [place] Your last dose was: Your next dose is:    
   
   
 Dose:  12.5 mg Take 12.5 mg by mouth two (2) times daily (with meals). Refills:  0 CENTRUM SILVER PO Your last dose was: Your next dose is: Take  by mouth. Takes one po daily. Refills:  0  
     
   
   
   
  
 doxazosin 2 mg tablet Commonly known as:  CARDURA Your last dose was: Your next dose is:    
   
   
 Dose:  2 mg Take 2 mg by mouth nightly. Refills:  0  
     
   
   
   
  
 FISH OIL PO Your last dose was: Your next dose is:    
   
   
 Dose:  1200 mg Take 1,200 mg by mouth daily. Refills:  0 GARLIC PO Your last dose was: Your next dose is:    
   
   
 Dose:  1 Cap Take 1 Cap by mouth daily. 1000 MG Refills:  0 Iron 325 mg (65 mg iron) tablet Generic drug:  ferrous sulfate Your last dose was: Your next dose is:    
   
   
 Dose:  65 mg Take 65 mg by mouth Daily (before breakfast). Refills:  0  
     
   
   
   
  
 losartan 100 mg tablet Commonly known as:  COZAAR Your last dose was: Your next dose is:    
   
   
 Dose:  100 mg Take 100 mg by mouth daily. Refills:  0  
     
   
   
   
  
 VITAMIN D3 1,000 unit tablet Generic drug:  cholecalciferol Your last dose was: Your next dose is:    
   
   
 Dose:  1000 Units Take 1,000 Units by mouth daily. Refills:  0 STOP taking these medications   
 aspirin 81 mg tablet Replaced by:  aspirin delayed-release 325 mg tablet Where to Get Your Medications Information on where to get these meds will be given to you by the nurse or doctor. ! Ask your nurse or doctor about these medications  
  aspirin delayed-release 325 mg tablet  
 diclofenac EC 50 mg EC tablet HYDROmorphone 2 mg tablet  
 traMADol 50 mg tablet

## 2017-08-14 NOTE — ANESTHESIA PROCEDURE NOTES
Peripheral Block    Start time: 8/14/2017 7:06 AM  End time: 8/14/2017 7:16 AM  Performed by: Kayleen Cordero  Authorized by: Kayleen Cordero       Pre-procedure: Indications: at surgeon's request, post-op pain management and procedure for pain    Preanesthetic Checklist: patient identified, risks and benefits discussed, site marked, timeout performed, anesthesia consent given and patient being monitored      Block Type:   Block Type:   Adductor canal  Laterality:  Right  Monitoring:  Standard ASA monitoring, continuous pulse ox, frequent vital sign checks, heart rate, responsive to questions and oxygen  Injection Technique:  Single shot  Procedures: ultrasound guided    Patient Position: supine  Prep: chlorhexidine    Location:  Mid thigh  Needle Type:  Stimuplex  Needle Localization:  Ultrasound guidance  Medication Injected:  0.5%  ropivacaine  Volume (mL):  25    Assessment:  Number of attempts:  1  Injection Assessment:  Incremental injection every 5 mL, local visualized surrounding nerve on ultrasound, negative aspiration for blood, no paresthesia, no intravascular symptoms and ultrasound image on chart  Patient tolerance:  Patient tolerated the procedure well with no immediate complications

## 2017-08-14 NOTE — OP NOTES
Name: Albert Bird  MRN:  [id number]  : 1945  Age:  71 y.o. Surgery Date: 2017      OPERATIVE REPORT   RIGHT UNCOMPARTMENTAL KNEE REPLACEMENT    PREOPERATIVE DIAGNOSIS: Osteoarthritis medial compartment, right knee. POSTOPERATIVE DIAGNOSIS: Osteoarthritis medial compartment, right knee. PROCEDURE PERFORMED: Right unicompartmental knee arthroplasty. SURGEON: Romi Syed MD    FIRST ASSISTANT:  Neil    ANESTHESIA: Spinal    PRE-OP ANTIBIOTIC: Ancef 2g    COMPLICATIONS: None. ESTIMATED BLOOD LOSS: 50 mL. SPECIMENS REMOVED: None. COMPONENTS IMPLANTED:   Implant Name Type Inv. Item Serial No.  Lot No. LRB No. Used Action   CEMENT BNE GENTAMC GHV 40GM -- SMARTSET - SN/A  CEMENT BNE GENTAMC GHV 40GM -- SMARTSET N/A [facility] [employer] [id number] Right 1 Implanted   FEM UNI PC HI-FLX RM/LL SZ D -- ZUK - SN/A  FEM UNI PC HI-FLX RM/LL SZ D -- ZUK N/A [employer] [id number] Right 1 Implanted   TRAY TIB UNI PC RM/LL 4 -- ZUK - SN/A  TRAY TIB UNI PC RM/LL 4 -- ZUK N/A [employer] [id number] Right 1 Implanted   INSERT TIB UNI 4 10MM -- ZUK - SN/A   INSERT TIB UNI 4 10MM -- ZUK N/A [employer] [id number] Right 1 Implanted       INDICATIONS: The patient is an 71 yrs male with progressive debilitating right knee pain due to osteoarthritis. Symptoms have progressed despite comprehensive conservative treatment and he presents for right unicompartmental knee replacement. Risks, benefits, alternatives of the procedure were reviewed  in detail and the patient desired to proceed. The patient understands the increased risk for perioperative medical complications due to their medical comorbidities. DESCRIPTION OF PROCEDURE: Anesthetic was initiated. Preoperative dose of antibiotic was given. Fontanez catheter was placed. Right side was confirmed as the operative side, prepped and draped in the usual sterile fashion.  Skin was covered with [place] occlusive dressing. Medial parapatellar approach was made to the right knee. The knee was examined. Severe medial compartment disease. No issues on the lateral or patellofemoral joints. The knee was exposed. The extramedullary alignment guide was used to make a proximal tibial cut. The femur was cut parallel to the tibia with a spacer block technique. The femur was sized for and D. The cutting block was placed and provisionally pinned, examined the flexion gap, translated the femoral component as far lateral as possible and created a rectangular flexion gap with rotation. The femur was pinned and finished for an D, tibia was prepared for a 4. The meniscus was removed. Bony surfaces were drilled and the sclerotic areas lavaged and dried the bony surfaces and implanted a size 4 tibia, size D femur, and a 10 mm poly. Cement was allowed to fully cure with no motion and all excess cement was removed. At this point, the knee was ranged, irrigated copiously with pulsatile lavage. Soft tissues were infiltrated with local anesthetic. The arthrotomy was closed with #2 Vicryl sutures and 0 Vicryl sutures. Deep wound was again irrigated. Skin and subcutaneous were closed in standard fashion. Sterile dressing was applied. There were no complications. No specimen was sent. Procedure was unicompartmental arthroplasty, right knee. The patient was taken to the recovery room in good condition.     Milena Gray MD

## 2017-08-14 NOTE — PERIOP NOTES
Pt straight cathed with 15 [place] red rubber catheter for 250 ml of clear yellow urine with scant amount of blood noticed once catheter removed.

## 2017-08-14 NOTE — H&P
Date of Surgery Update:  James Nevarez was seen and examined. History and physical has been reviewed. The patient has been examined. There have been no significant clinical changes since the completion of the originally dated History and Physical.  Patient identified by surgeon; surgical site was confirmed by patient and surgeon.     Signed By: Fede Russell MD     August 14, 2017 7:30 AM

## 2017-08-14 NOTE — PROGRESS NOTES
Primary Nurse Frantz Cho RN and Emely Jones RN performed a dual skin assessment on this patient No impairment noted  Ravinder score is 23

## 2017-08-14 NOTE — DISCHARGE INSTRUCTIONS
After [facility]   Discharge Instructions Partial Knee Replacement-Dr. Sony BOOTHE [address]    Patient Name: Fain Schwab  Date of procedure: 8/14/2017    Procedure: Procedure(s):  RIGHT UNICOMPARTMENTAL KNEE ARTHROPLASTY  Surgeon: Gabrielle Barnes) and Role:     * Tamika Clark MD - Primary   PCP: Britta Alvarez MD  Date of discharge: No discharge date for patient encounter. Follow up appointments  -follow up with Dr. Sony BOOTHE [facility] in 4 weeks. Call [phone] to make an appointment. Activity  -weight bearing as tolerated with walker or crutches    Incision Care  - The Aquacel (brown, waterproof) surgical dressing is to remain on the knee for 7 days. On the 7th day gently peel the dressing off by carefully lifting the edge and stretching it slightly to break the adhesive seal and leave incision open to air. Patient may take a shower with the Aquacel dressing in place. Preventing blood clots   - Please take 325 mg aspirin twice daily. Continue for one month following surgery. Pain management  - Please take scheduled 650 mg tylenol every 6 hours for 4 weeks  - Please take scheduled diclofenac 50 mg twice daily for 4 weeks  - Please take tramadol every 6 hours for pain as needed for pain. - For breakthrough please take 2 mg dilaudid    - Avoid alcoholic beverages while taking pain medication  - Do not take any over-the-counter medication for pain except Tylenol (acetaminophen)  - Please be aware that many medications contain Tylenol. We do not want you to over medicate so please read the information below as a guide.   Do not take more than 4 Grams of Tylenol in a 24 hour  - You may place an ice bag on your knee for 15-20 minutes after exercising and as needed throughout the day and night      Diet  -resume usual diet; encourage fluids; provide foods high in fiber  -provide stool softeners/laxatives as needed    [address]  -remove Aquacel dressing at 7 days post-op -complete head to toe assessment, vital signs  -medication reconciliation  -review pain management  -manage chronic medical conditions    Weight bearing status:  Precautions at Admission: WBAT   As tolerated     Right Side Weight Bearing: As tolerated    Mobility Status:  Supine to Sit: Modified independent  Sit to Stand: Modified independent  Sit to Supine: Modified independent  Bed to Chair: Modified independent    Gait:  Distance (ft): 200 Feet (ft)  Ambulation - Level of Assistance: Supervision  Assistive Device: Gait belt, Crutches       ADL status overall composite:            Toilet Transfer : Modified independent     Physical Therapy  Phase one:  -assessment and evaluation-bed mobility; functional transfers (bed, chair, bathroom, stairs); ambulation with equipment, safety and ability to get out of the facility in the event of an emergency  -review and maintain weight bearing as tolerated  -discuss pain management  -review how to do ADLs    - Exercise Program-refer to pages 32-40 of the patient handbook for exercises  -supine exercises-ankle pumps, hamstring sets, quad sets, heel slides, short arc quad sets, long arc quads-prop heel on pillow for stretch, straight leg raise  -sitting exercises-active knee flexion, passive knee flexion, active knee extension, ankle pumps, bicep curls (use weights as appropriate), triceps pushups, shoulder flexion (use weights as appropriate)  -standing exercises holding onto supportive counter-toe raises, heel raises, mini-squats, heel/toe touches with knee bend, marching in place, hamstring curls    Phase two:  -bed mobility, functional transfers, gait training-wean from walker/crutches as tolerated  -stair climbing, shower transfers, car transfers, increase independence with ADLs    -Exercise Program-refer to pages 32-40 of the patient handbook   -continue with supine, sitting and standing exercises as above    Physical therapy goals by discharge from Rehab/SNF  -Safe ambulation with walker, crutches or cane if needed (level surfaces and   stairs)  -Flexion > 90 degrees, extension 0 degrees  -Daily performance of exercise program  -Independent with stair climbing and car transfers  -Progress to community ambulator (least restrictive assistive device)

## 2017-08-15 VITALS
HEIGHT: 70 IN | WEIGHT: 181 LBS | OXYGEN SATURATION: 95 % | BODY MASS INDEX: 25.91 KG/M2 | HEART RATE: 64 BPM | RESPIRATION RATE: 16 BRPM | TEMPERATURE: 97.9 F | SYSTOLIC BLOOD PRESSURE: 150 MMHG | DIASTOLIC BLOOD PRESSURE: 79 MMHG

## 2017-08-15 LAB
ANION GAP BLD CALC-SCNC: 9 MMOL/L (ref 5–15)
BUN SERPL-MCNC: 16 MG/DL (ref 6–20)
BUN/CREAT SERPL: 12 (ref 12–20)
CALCIUM SERPL-MCNC: 8 MG/DL (ref 8.5–10.1)
CHLORIDE SERPL-SCNC: 108 MMOL/L (ref 97–108)
CO2 SERPL-SCNC: 22 MMOL/L (ref 21–32)
CREAT SERPL-MCNC: 1.29 MG/DL (ref 0.7–1.3)
GLUCOSE SERPL-MCNC: 137 MG/DL (ref 65–100)
HGB BLD-MCNC: 10.1 G/DL (ref 12.1–17)
POTASSIUM SERPL-SCNC: 3.9 MMOL/L (ref 3.5–5.1)
SODIUM SERPL-SCNC: 139 MMOL/L (ref 136–145)

## 2017-08-15 PROCEDURE — 36415 COLL VENOUS BLD VENIPUNCTURE: CPT | Performed by: ORTHOPAEDIC SURGERY

## 2017-08-15 PROCEDURE — 85018 HEMOGLOBIN: CPT | Performed by: ORTHOPAEDIC SURGERY

## 2017-08-15 PROCEDURE — 51798 US URINE CAPACITY MEASURE: CPT

## 2017-08-15 PROCEDURE — 74011250637 HC RX REV CODE- 250/637: Performed by: ORTHOPAEDIC SURGERY

## 2017-08-15 PROCEDURE — 99218 HC RM OBSERVATION: CPT

## 2017-08-15 PROCEDURE — 74011250636 HC RX REV CODE- 250/636: Performed by: ORTHOPAEDIC SURGERY

## 2017-08-15 PROCEDURE — 80048 BASIC METABOLIC PNL TOTAL CA: CPT | Performed by: ORTHOPAEDIC SURGERY

## 2017-08-15 RX ADMIN — TRAMADOL HYDROCHLORIDE 50 MG: 50 TABLET, FILM COATED ORAL at 11:23

## 2017-08-15 RX ADMIN — AMLODIPINE BESYLATE 10 MG: 5 TABLET ORAL at 08:35

## 2017-08-15 RX ADMIN — ASPIRIN 325 MG: 325 TABLET, DELAYED RELEASE ORAL at 08:35

## 2017-08-15 RX ADMIN — CARVEDILOL 12.5 MG: 12.5 TABLET, FILM COATED ORAL at 07:49

## 2017-08-15 RX ADMIN — Medication 10 ML: at 04:43

## 2017-08-15 RX ADMIN — POLYETHYLENE GLYCOL 3350 17 G: 17 POWDER, FOR SOLUTION ORAL at 08:35

## 2017-08-15 RX ADMIN — ACETAMINOPHEN 650 MG: 325 TABLET, FILM COATED ORAL at 06:11

## 2017-08-15 RX ADMIN — FAMOTIDINE 20 MG: 20 TABLET ORAL at 08:35

## 2017-08-15 RX ADMIN — ACETAMINOPHEN 650 MG: 325 TABLET, FILM COATED ORAL at 01:11

## 2017-08-15 RX ADMIN — TRAMADOL HYDROCHLORIDE 50 MG: 50 TABLET, FILM COATED ORAL at 06:11

## 2017-08-15 RX ADMIN — SODIUM CHLORIDE 125 ML/HR: 900 INJECTION, SOLUTION INTRAVENOUS at 04:46

## 2017-08-15 RX ADMIN — VITAMIN D, TAB 1000IU (100/BT) 1000 UNITS: 25 TAB at 08:35

## 2017-08-15 RX ADMIN — DOCUSATE SODIUM AND SENNOSIDES 1 TABLET: 8.6; 5 TABLET, FILM COATED ORAL at 08:35

## 2017-08-15 RX ADMIN — KETOROLAC TROMETHAMINE 15 MG: 30 INJECTION, SOLUTION INTRAMUSCULAR at 04:43

## 2017-08-15 RX ADMIN — LOSARTAN POTASSIUM 100 MG: 50 TABLET ORAL at 08:35

## 2017-08-15 NOTE — PROGRESS NOTES
Voided 550 ml over 12 hrs. Bladder scanned for 72 ml. No distension noted upon palpation of the bladder. Patient denies discomfort.

## 2017-08-15 NOTE — PROGRESS NOTES
I have reviewed discharge instructions with the patient. The patient verbalized understanding. Patient leaving via car with wife. Patient leaving with discharge instructions and scripts. Patient leaving via wheelchair with volunteers.

## 2017-08-15 NOTE — PROGRESS NOTES
Bedside and Verbal shift change report given to Jo Gaviria (oncoming nurse) by David Martinez RN (offgoing nurse). Report given with Johnnie TESFAYE and MAR.

## 2017-08-15 NOTE — PROGRESS NOTES
Spiritual Care Partner Volunteer visited patient in [address] [place] [address] [address] on 8/15/17. Documented by:  Lucien Hernández MZabrina.    Paging Service [phone])

## 2017-08-15 NOTE — PROGRESS NOTES
Problem: Falls - Risk of  Goal: *Absence of Falls  Document Chris Fall Risk and appropriate interventions in the flowsheet.    Outcome: Progressing Towards Goal  Fall Risk Interventions:  Mobility Interventions: PT Consult for mobility concerns           Medication Interventions: Patient to call before getting OOB     Elimination Interventions: Patient to call for help with toileting needs

## 2017-08-15 NOTE — PROGRESS NOTES
Care Management Interventions  PCP Verified by CM: Yes (Dr. Gonsalo Lee)  Palliative Care Consult (Criteria: CHF and RRAT>21): No  Reason for No Palliative Care Consult: Other (see comment) (no needs/no orders)  Mode of Transport at Discharge: Self  Transition of Care Consult (CM Consult): [address]: No  Reason Outside [place]: Physician referred to specific agency Tiff Madsen from at home care called the patient prior to surgery. the patient R [address])  MyChart Signup: No  Discharge Durable Medical Equipment: No  Physical Therapy Consult: Yes  Occupational Therapy Consult: Yes  Speech Therapy Consult: No  Current Support Network: Lives with Spouse  Confirm Follow Up Transport: Family  Plan discussed with Pt/Family/Caregiver: Yes  Freedom of Choice Offered: Yes  Discharge Location  Discharge Placement: Home with home health    Home care orders noted. CRM met with the patient to offer agency choice. The patient chose [facility]. Referral sent via [employer] and accepted. The patient will discharge home today.  OBED

## 2017-08-15 NOTE — PROGRESS NOTES
No new complaints overnight. Denies chest pain or shortness of breath. Tolerating diet well      GEN:  NAD.  AOx3   ABD:  S/NT/ND   RLE:  Dressing C/D/I , 5/5 motor, Calf nttp (Bilat), Sensation rossly intact to light touch throughout, 1+ dp/pt pulses, foot perfused    Patient Vitals for the past 24 hrs:   Temp Pulse Resp BP SpO2   08/15/17 0757 97.9 °F (36.6 °C) 64 16 150/79 95 %   08/15/17 0748 97.9 °F (36.6 °C) 65 18 140/79 97 %   08/15/17 0326 98.3 °F (36.8 °C) 77 16 146/73 95 %   08/14/17 2327 97.8 °F (36.6 °C) 60 16 148/71 100 %   08/14/17 1956 98 °F (36.7 °C) 82 16 152/89 100 %   08/14/17 1659 - 70 - 148/72 -   08/14/17 1446 97.7 °F (36.5 °C) 72 16 154/74 100 %   08/14/17 1349 97.6 °F (36.4 °C) 73 16 156/77 100 %   08/14/17 1305 - - - 130/88 -   08/14/17 1242 - - - 175/85 -   08/14/17 1239 - - - 149/90 -   08/14/17 1154 97.7 °F (36.5 °C) 69 15 127/41 (!) 3 %   08/14/17 1053 97.6 °F (36.4 °C) (!) 55 14 137/72 99 %   08/14/17 1030 - (!) 55 14 122/62 99 %   08/14/17 1025 - 64 17 - 100 %   08/14/17 1020 97.6 °F (36.4 °C) 66 14 - 99 %   08/14/17 1015 - 61 16 114/62 98 %   08/14/17 1003 97.5 °F (36.4 °C) - - - -   08/14/17 1000 - (!) 56 11 118/59 99 %   08/14/17 0945 - (!) 59 12 115/62 98 %   08/14/17 0940 - 62 12 112/58 98 %   08/14/17 0937 98.2 °F (36.8 °C) 62 11 114/63 98 %   08/14/17 0935 - - - 114/63 -   08/14/17 0934 - - - 106/60 -       Current Facility-Administered Medications   Medication Dose Route Frequency    amLODIPine (NORVASC) tablet 10 mg  10 mg Oral DAILY    carvedilol (COREG) tablet 12.5 mg  12.5 mg Oral BID WITH MEALS    cholecalciferol (VITAMIN D3) tablet 1,000 Units  1,000 Units Oral DAILY    doxazosin (CARDURA) tablet 2 mg  2 mg Oral QHS    losartan (COZAAR) tablet 100 mg  100 mg Oral DAILY    0.9% sodium chloride infusion  125 mL/hr IntraVENous CONTINUOUS    sodium chloride 0.9 % bolus infusion 500 mL  500 mL IntraVENous ONCE PRN    sodium chloride (NS) flush 5-10 mL  5-10 mL IntraVENous Q8H    sodium chloride (NS) flush 5-10 mL  5-10 mL IntraVENous PRN    acetaminophen (TYLENOL) tablet 650 mg  650 mg Oral Q6H    HYDROmorphone (PF) (DILAUDID) injection 0.5 mg  0.5 mg IntraVENous Q4H PRN    naloxone (NARCAN) injection 0.4 mg  0.4 mg IntraVENous PRN    ondansetron (ZOFRAN) injection 4 mg  4 mg IntraVENous Q4H PRN    prochlorperazine (COMPAZINE) with saline injection 5 mg  5 mg IntraVENous Q6H PRN    hydrOXYzine HCl (ATARAX) tablet 10 mg  10 mg Oral Q8H PRN    famotidine (PEPCID) tablet 20 mg  20 mg Oral BID    senna-docusate (PERICOLACE) 8.6-50 mg per tablet 1 Tab  1 Tab Oral BID    polyethylene glycol (MIRALAX) packet 17 g  17 g Oral DAILY    [START ON 8/16/2017] bisacodyl (DULCOLAX) suppository 10 mg  10 mg Rectal DAILY PRN    aspirin delayed-release tablet 325 mg  325 mg Oral BID    ketorolac (TORADOL) injection 15 mg  15 mg IntraVENous Q6H    traMADol (ULTRAM) tablet 50 mg  50 mg Oral Q6H    diclofenac EC (VOLTAREN) tablet 50 mg  50 mg Oral BID       Lab Results   Component Value Date/Time    HGB 10.1 08/15/2017 04:48 AM    INR 1.1 08/07/2017 12:36 PM       Lab Results   Component Value Date/Time    Sodium 139 08/15/2017 04:48 AM    Potassium 3.9 08/15/2017 04:48 AM    Chloride 108 08/15/2017 04:48 AM    CO2 22 08/15/2017 04:48 AM    BUN 16 08/15/2017 04:48 AM    Creatinine 1.29 08/15/2017 04:48 AM    Calcium 8.0 08/15/2017 04:48 AM            71 y.o. male s/p right medial uni knee arthroplasty on 8/14/2017  . Doing well.        ABX: Complete 24 hours perioperative abx  DVT Prophylaxis: Aspirin 325 enteric coated  Weight Bearing: WBAT RLE   Pain Control: Diclofenac,Tylenol, scheduled tramadol, PO dilaudid ( allergic to oxy)  [address]

## 2018-09-06 NOTE — PROGRESS NOTES
NP ORTHO PROGRESS NOTE  Post Op day: 1 Day Post-Op    August 15, 2017 11:00 AM     Pat Haji  [id number]  male  71 y.o.   1945    Admit date: 2017  Date of Surgery: 2017   Procedures: Procedure(s):  RIGHT UNICOMPARTMENTAL KNEE ARTHROPLASTY  Admitting Physician: Silvano Golden MD   Surgeon: Jazz Badillo) and Role:     * Silvano Golden MD - Primary    Chart/Meds/Labs Reviewed    Subjective:    Complaints: None  Denies Dizziness, CP, SOB, N/V, Abdominal pain, numbness or tingling of extremities. Able to perform ankle pumps easily. Progressing with mobility. Ready to [facility] with [facility]. No issues of concern. Incisional pain control: well controlled    Oral diet: Tolerating diet well    Objective:  General: Alert,Ox4, cooperative, NAD  HEENT: Atraumatic, PERRL, anicteric sclerae  Lungs: Bilateral expansion. Equal excursion. No accessory muscle use. Gastrointestinal:  Soft, non-tender, non-distended  Extremities:  Neurovasc exam WDL. + DP pulses. Sensation intact to light touch. Motor: + DF/PF          Calves non-tender upon palpation or with passive stretch. No significant erythema or swelling. Dressing: clean, dry and intact.     Vital Signs:   Visit Vitals    /79 (BP 1 Location: Right arm, BP Patient Position: At rest)    Pulse 64    Temp 97.9 °F (36.6 °C)    Resp 16    Ht 5' 10\" (1.778 m)    Wt 82.1 kg (181 lb)    SpO2 95%    BMI 25.97 kg/m2    O2 Flow Rate (L/min): 1 l/min O2 Device: Room air     Temp (24hrs), Av.9 °F (36.6 °C), Min:97.6 °F (36.4 °C), Max:98.3 °F (36.8 °C)      Intake/output-last 8 hours:      LAB:   Recent Results (from the past 24 hour(s))   METABOLIC PANEL, BASIC    Collection Time: 08/15/17  4:48 AM   Result Value Ref Range    Sodium 139 136 - 145 mmol/L    Potassium 3.9 3.5 - 5.1 mmol/L    Chloride 108 97 - 108 mmol/L    CO2 22 21 - 32 mmol/L    Anion gap 9 5 - 15 mmol/L    Glucose 137 (H) 65 - 100 mg/dL    BUN 16 6 - 20 MG/DL    Creatinine 1.29 0.70 - 1.30 MG/DL    BUN/Creatinine ratio 12 12 - 20      GFR est AA >60 >60 ml/min/1.73m2    GFR est non-AA 55 (L) >60 ml/min/1.73m2    Calcium 8.0 (L) 8.5 - 10.1 MG/DL   HEMOGLOBIN    Collection Time: 08/15/17  4:48 AM   Result Value Ref Range    HGB 10.1 (L) 12.1 - 17.0 g/dL      Lab Results   Component Value Date/Time    INR 1.1 08/07/2017 12:36 PM     Lab Results   Component Value Date/Time    HGB 10.1 08/15/2017 04:48 AM    HGB 11.9 08/07/2017 12:36 PM    HGB 12.1 10/11/2015 01:51 PM    HGB 13.2 03/08/2010 08:10 PM         PT/OT:   Gait:  Gait  Speed/Valentine: Slow  Ambulation - Level of Assistance: Supervision  Distance (ft): 200 Feet (ft)  Assistive Device: Gait belt, Crutches  Rail Use: Left   Stairs - Level of Assistance: Supervision  Number of Stairs Trained: 4                 PATIENT MOBILITY  Bed Mobility Training  Rolling: Modified independent  Supine to Sit: Modified independent  Sit to Supine: Modified independent  Scooting: Independent  Transfer Training  Sit to Stand: Modified independent  Stand to Sit: Modified independent  Bed to Chair: Modified independent      Gait Training  Assistive Device: Gait belt, Crutches  Ambulation - Level of Assistance: Supervision  Distance (ft): 200 Feet (ft)  Stairs - Level of Assistance: Supervision  Number of Stairs Trained: 4  Rail Use: Left    Weight Bearing Status  Right Side Weight Bearing: As tolerated        Assessment and Plan    Active Problems:    Osteoarthritis of right knee (8/14/2017)          POD#1 Procedure(s):  RIGHT UNICOMPARTMENTAL KNEE ARTHROPLASTY  Stable postop. Mild expected acute blood loss anemia related to surgery. No indications of bleeding. VSSAF.  VTE prophylaxis: ASA, Mobilization, Ankle pumping exercises, SCDs per order if not able to exercise or mobilize well. Weight bearing:  WBAT  Pain management: well managed. Continue present medical management.    Ice packs & elevation of extremity , active gentle ROM & mobilize frequently for short periods of time. PT  Wound benign. Neurovascularly intact. Progressing well. No indications of concerns--encouraged patient to follow-up with his PCP for any medical concerns. Continue present plans per orthopedic attending surgeon & interdisciplinary team for joint replacement.         Signed By: Gayatri Esqueda NP    RN, MSN, MA, Adult NP-BC Initial (On Arrival)

## 2019-03-06 ENCOUNTER — HOSPITAL ENCOUNTER (EMERGENCY)
Age: 74
Discharge: HOME | End: 2019-03-06
Attending: EMERGENCY MEDICINE | Admitting: EMERGENCY MEDICINE
Payer: MEDICARE

## 2019-03-06 ENCOUNTER — APPOINTMENT (OUTPATIENT)
Dept: GENERAL RADIOLOGY | Age: 74
End: 2019-03-06
Attending: INTERNAL MEDICINE
Payer: MEDICARE

## 2019-03-06 VITALS
HEIGHT: 69 IN | SYSTOLIC BLOOD PRESSURE: 171 MMHG | WEIGHT: 184 LBS | HEART RATE: 76 BPM | DIASTOLIC BLOOD PRESSURE: 94 MMHG | RESPIRATION RATE: 16 BRPM | BODY MASS INDEX: 27.25 KG/M2 | OXYGEN SATURATION: 96 % | TEMPERATURE: 97.9 F

## 2019-03-06 DIAGNOSIS — T14.8XXA CRUSH INJURY: ICD-10-CM

## 2019-03-06 DIAGNOSIS — S61.211A LACERATION OF LEFT INDEX FINGER WITHOUT FOREIGN BODY WITHOUT DAMAGE TO NAIL, INITIAL ENCOUNTER: Primary | ICD-10-CM

## 2019-03-06 PROCEDURE — 75810000294 HC INTERM/LAYERED WND RPR

## 2019-03-06 PROCEDURE — 99282 EMERGENCY DEPT VISIT SF MDM: CPT

## 2019-03-06 PROCEDURE — 73140 X-RAY EXAM OF FINGER(S): CPT

## 2019-03-06 PROCEDURE — 74011250636 HC RX REV CODE- 250/636: Performed by: PHYSICIAN ASSISTANT

## 2019-03-06 PROCEDURE — 77030031139 HC SUT VCRL2 J&J -A

## 2019-03-06 PROCEDURE — 77030031132 HC SUT NYL COVD -A

## 2019-03-06 PROCEDURE — 90715 TDAP VACCINE 7 YRS/> IM: CPT | Performed by: PHYSICIAN ASSISTANT

## 2019-03-06 PROCEDURE — 77030018836 HC SOL IRR NACL ICUM -A

## 2019-03-06 PROCEDURE — 74011000250 HC RX REV CODE- 250: Performed by: PHYSICIAN ASSISTANT

## 2019-03-06 PROCEDURE — 96372 THER/PROPH/DIAG INJ SC/IM: CPT

## 2019-03-06 PROCEDURE — 90471 IMMUNIZATION ADMIN: CPT

## 2019-03-06 RX ORDER — CEPHALEXIN 500 MG/1
500 CAPSULE ORAL 4 TIMES DAILY
Qty: 40 CAP | Refills: 0 | Status: SHIPPED | OUTPATIENT
Start: 2019-03-06 | End: 2019-03-16

## 2019-03-06 RX ORDER — LIDOCAINE HYDROCHLORIDE 10 MG/ML
10 INJECTION, SOLUTION EPIDURAL; INFILTRATION; INTRACAUDAL; PERINEURAL ONCE
Status: COMPLETED | OUTPATIENT
Start: 2019-03-06 | End: 2019-03-06

## 2019-03-06 RX ORDER — TRAMADOL HYDROCHLORIDE 50 MG/1
50 TABLET ORAL
Qty: 20 TAB | Refills: 0 | Status: SHIPPED | OUTPATIENT
Start: 2019-03-06 | End: 2019-03-09

## 2019-03-06 RX ORDER — BACITRACIN 500 UNIT/G
1 PACKET (EA) TOPICAL 3 TIMES DAILY
Status: DISCONTINUED | OUTPATIENT
Start: 2019-03-06 | End: 2019-03-06 | Stop reason: HOSPADM

## 2019-03-06 RX ADMIN — LIDOCAINE HYDROCHLORIDE 10 ML: 10 INJECTION, SOLUTION EPIDURAL; INFILTRATION; INTRACAUDAL; PERINEURAL at 19:20

## 2019-03-06 RX ADMIN — CEFAZOLIN SODIUM: 1 INJECTION, POWDER, FOR SOLUTION INTRAMUSCULAR; INTRAVENOUS at 20:03

## 2019-03-06 RX ADMIN — BACITRACIN 1 PACKET: 500 OINTMENT TOPICAL at 19:23

## 2019-03-06 RX ADMIN — TETANUS TOXOID, REDUCED DIPHTHERIA TOXOID AND ACELLULAR PERTUSSIS VACCINE, ADSORBED 0.5 ML: 5; 2.5; 8; 8; 2.5 SUSPENSION INTRAMUSCULAR at 19:21

## 2019-03-06 NOTE — ED TRIAGE NOTES
Pt states that while at work he \"smashed\" his left index finger stating that he broke the skin. Pt is updated with tetanus.

## 2019-03-07 NOTE — DISCHARGE INSTRUCTIONS
Patient Education        Cuts on the Hand Closed With Stitches: Care Instructions  Your Care Instructions    A cut on your hand can be on your fingers, your thumb, or the front or back of your hand. Sometimes a cut can injure the tendons, blood vessels, or nerves of your hand. The doctor used stitches to close the cut. Using stitches also helps the cut heal and reduces scarring. The doctor may have given you a splint to help prevent you from moving your hand, fingers, or thumb. If the cut went deep and through the skin, the doctor put in two layers of stitches. The deeper layer brings the deep part of the cut together. These stitches will dissolve and don't need to be removed. The stitches in the upper layer are the ones you see on the cut. You will probably have a bandage. You will need to have the stitches removed, usually in 7 to 14 days. The doctor may suggest that you see a hand specialist if the cut is very deep or if you have trouble moving your fingers or have less feeling in your hand. The doctor has checked you carefully, but problems can develop later. If you notice any problems or new symptoms, get medical treatment right away. Follow-up care is a key part of your treatment and safety. Be sure to make and go to all appointments, and call your doctor if you are having problems. It's also a good idea to know your test results and keep a list of the medicines you take. How can you care for yourself at home? · Keep the cut dry for the first 24 to 48 hours. After this, you can shower if your doctor okays it. Pat the cut dry. · Don't soak the cut, such as in a bathtub. Your doctor will tell you when it's safe to get the cut wet. · If your doctor told you how to care for your cut, follow your doctor's instructions. If you did not get instructions, follow this general advice:  ? After the first 24 to 48 hours, wash around the cut with clean water 2 times a day.  Don't use hydrogen peroxide or alcohol, which can slow healing. ? You may cover the cut with a thin layer of petroleum jelly, such as Vaseline, and a nonstick bandage. ? Apply more petroleum jelly and replace the bandage as needed. · Prop up the sore hand on a pillow anytime you sit or lie down during the next 3 days. Try to keep it above the level of your heart. This will help reduce swelling. · Avoid any activity that could cause your cut to reopen. · Do not remove the stitches on your own. Your doctor will tell you when to come back to have the stitches removed. · Be safe with medicines. Take pain medicines exactly as directed. ? If the doctor gave you a prescription medicine for pain, take it as prescribed. ? If you are not taking a prescription pain medicine, ask your doctor if you can take an over-the-counter medicine. When should you call for help? Call your doctor now or seek immediate medical care if:    · You have new pain, or your pain gets worse.     · The skin near the cut is cold or pale or changes color.     · You have tingling, weakness, or numbness near the cut.     · The cut starts to bleed, and blood soaks through the bandage. Oozing small amounts of blood is normal.     · You have trouble moving the area of the hand near the cut.     · You have symptoms of infection, such as:  ? Increased pain, swelling, warmth, or redness around the cut.  ? Red streaks leading from the cut.  ? Pus draining from the cut.  ? A fever.    Watch closely for changes in your health, and be sure to contact your doctor if:    · You do not get better as expected. Where can you learn more? Go to http://do-kenyatta.info/. Enter T250 in the search box to learn more about \"Cuts on the Hand Closed With Stitches: Care Instructions. \"  Current as of: September 23, 2018  Content Version: 11.9  © 1370-1026 [employer].  Care instructions adapted under license by [employer] (which disclaims liability or warranty for this information). If you have questions about a medical condition or this instruction, always ask your healthcare professional. [place] [address] any warranty or liability for your use of this information.

## 2019-03-07 NOTE — ED NOTES
Non-adhesive dressing placed on left index finger, and wrapped with tube guaze. Patient given discharge instructions opportunity for questions given patient verbalizes understanding.

## 2019-03-07 NOTE — ED PROVIDER NOTES
69 yo M with hx of HTN, GERD, arthritis and carpal tunnel here for evaluation of laceration to L second digit. Smahsed between two metal pieces at work. Full ROM. Bleeding controlled. TD ? 5 years ago. The history is provided by the patient. Finger Pain    This is a new problem. The problem occurs constantly. The pain is present in the left fingers. The quality of the pain is described as aching. The pain is at a severity of 6/10. The pain is mild. Pertinent negatives include no numbness, no back pain and no neck pain. He has tried nothing for the symptoms. There has been a history of trauma.         Past Medical History:   Diagnosis Date    Arthritis     thumbs    GERD (gastroesophageal reflux disease)     hx of    Hypertension     Other ill-defined conditions(749.89)     bilateral carpal tunnel       Past Surgical History:   Procedure Laterality Date    HX APPENDECTOMY      HX KNEE ARTHROSCOPY Right     for torn menicus    HX ORTHOPAEDIC      back    HX OTHER SURGICAL      lipoma removed    HX SEPTOPLASTY           Family History:   Problem Relation Age of Onset    Kidney Disease Mother         dialysis    Cancer Father         prostate    Hypertension Father     Anesth Problems Neg Hx        Social History     Socioeconomic History    Marital status:      Spouse name: Not on file    Number of children: Not on file    Years of education: Not on file    Highest education level: Not on file   Social Needs    Financial resource strain: Not on file    Food insecurity - worry: Not on file    Food insecurity - inability: Not on file   [employer] needs - medical: Not on file   [employer] needs - non-medical: Not on file   Occupational History    Not on file   Tobacco Use    Smoking status: Never Smoker    Smokeless tobacco: Never Used   Substance and Sexual Activity    Alcohol use: No    Drug use: No    Sexual activity: Not on file   Other Topics Concern    Not on file   Social History Narrative    Not on file         ALLERGIES: Oxycodone    Review of Systems   Constitutional: Negative. HENT: Negative for ear discharge. Eyes: Negative for photophobia, pain, discharge and visual disturbance. Respiratory: Negative for apnea, cough, chest tightness and shortness of breath. Cardiovascular: Negative for chest pain, palpitations and leg swelling. Gastrointestinal: Negative for abdominal distention, abdominal pain and blood in stool. Genitourinary: Negative for difficulty urinating, dysuria, flank pain, frequency and hematuria. Musculoskeletal: Positive for myalgias. Negative for back pain, gait problem and neck pain. Skin: Positive for color change and wound. Negative for pallor. Neurological: Negative for dizziness, syncope, weakness, numbness and headaches. Psychiatric/Behavioral: Negative for behavioral problems and confusion. The patient is not nervous/anxious. Vitals:    03/06/19 1620 03/06/19 1825   BP:  (!) 171/94   Pulse: 85 76   Resp:  16   Temp:  97.9 °F (36.6 °C)   SpO2: 97% 96%   Weight:  83.5 kg (184 lb)   Height:  5' 9\" (1.753 m)            Physical Exam   Constitutional: He is oriented to person, place, and time. He appears well-developed and well-nourished. No distress. HENT:   Head: Normocephalic and atraumatic. Right Ear: External ear normal.   Left Ear: External ear normal.   Nose: Nose normal.   Mouth/Throat: Oropharynx is clear and moist.   Eyes: Conjunctivae and EOM are normal. Pupils are equal, round, and reactive to light. Right eye exhibits no discharge. Left eye exhibits no discharge. Neck: Normal range of motion. Neck supple. Cardiovascular: Normal rate, regular rhythm, normal heart sounds and intact distal pulses. Pulses:       Radial pulses are 2+ on the right side. Pulmonary/Chest: Effort normal and breath sounds normal.   Abdominal: Soft. Bowel sounds are normal. He exhibits no distension.  There is no tenderness. There is no rebound and no guarding. Musculoskeletal: Normal range of motion. He exhibits no edema or tenderness. Hands:  Neurological: He is alert and oriented to person, place, and time. No cranial nerve deficit. Coordination normal.   Skin: Skin is warm and dry. No rash noted. Psychiatric: He has a normal mood and affect. His behavior is normal. Judgment and thought content normal.   Nursing note and vitals reviewed. MDM       Wound Repair  Date/Time: 3/6/2019 7:10 PM  Performed by: PAPreparation: skin prepped with Betadine and skin prepped with Shur-Clens  Pre-procedure re-eval: Immediately prior to the procedure, the patient was reevaluated and found suitable for the planned procedure and any planned medications. Location details: left index finger  Wound length:2.6 - 7.5 cm  Anesthesia: local infiltration    Anesthesia:  Local Anesthetic: lidocaine 1% without epinephrine  Anesthetic total: 5 mL  Foreign bodies: no foreign bodies  Irrigation solution: saline  Irrigation method: syringe  Debridement: minimal  Skin closure: 4-0 nylon (10)  Subcutaneous closure: Vicryl (4)  Number of sutures: 14  Technique: simple and interrupted  Approximation: close  Dressing: antibiotic ointment, non-adhesive packing strip and tube gauze  Patient tolerance: Patient tolerated the procedure well with no immediate complications  My total time at bedside, performing this procedure was 31-45 minutes. Comments: Wound irrigated copiously with betadine/saline. Patient has been reassessed. Feeling much better. Reviewed labs, medications and radiographics with patient. Ready to discharge home. Discussed case with attending Physician. Agrees with care and will D/C with follow up. Patient's results have been reviewed with them.   Patient and/or family have verbally conveyed their understanding and agreement of the patient's signs, symptoms, diagnosis, treatment and prognosis and additionally agree to follow up as recommended or return to the Emergency Room should their condition change prior to follow-up. Discharge instructions have also been provided to the patient with some educational information regarding their diagnosis as well a list of reasons why they would want to return to the ER prior to their follow-up appointment should their condition change.   Akash Avila, PA

## 2021-03-16 ENCOUNTER — TRANSCRIBE ORDER (OUTPATIENT)
Dept: SCHEDULING | Age: 76
End: 2021-03-16

## 2021-03-16 DIAGNOSIS — N18.30 CHRONIC KIDNEY DISEASE, STAGE III (MODERATE) (HCC): Primary | ICD-10-CM

## 2021-03-22 ENCOUNTER — TRANSCRIBE ORDER (OUTPATIENT)
Dept: SCHEDULING | Age: 76
End: 2021-03-22

## 2021-03-22 DIAGNOSIS — N18.30 CHRONIC KIDNEY DISEASE, STAGE III (MODERATE) (HCC): Primary | ICD-10-CM

## 2022-03-19 PROBLEM — M17.11 OSTEOARTHRITIS OF RIGHT KNEE: Status: ACTIVE | Noted: 2017-08-14

## 2022-06-10 ENCOUNTER — OFFICE VISIT (OUTPATIENT)
Dept: ORTHOPEDIC SURGERY | Age: 77
End: 2022-06-10
Payer: MEDICARE

## 2022-06-10 VITALS — HEIGHT: 70 IN | BODY MASS INDEX: 25.77 KG/M2 | WEIGHT: 180 LBS

## 2022-06-10 DIAGNOSIS — M25.562 CHRONIC PAIN OF LEFT KNEE: Primary | ICD-10-CM

## 2022-06-10 DIAGNOSIS — G89.29 CHRONIC PAIN OF LEFT KNEE: Primary | ICD-10-CM

## 2022-06-10 DIAGNOSIS — M17.12 ARTHRITIS OF LEFT KNEE: ICD-10-CM

## 2022-06-10 PROCEDURE — 20610 DRAIN/INJ JOINT/BURSA W/O US: CPT | Performed by: ORTHOPAEDIC SURGERY

## 2022-06-10 PROCEDURE — 99214 OFFICE O/P EST MOD 30 MIN: CPT | Performed by: ORTHOPAEDIC SURGERY

## 2022-06-10 PROCEDURE — G8432 DEP SCR NOT DOC, RNG: HCPCS | Performed by: ORTHOPAEDIC SURGERY

## 2022-06-10 PROCEDURE — 1101F PT FALLS ASSESS-DOCD LE1/YR: CPT | Performed by: ORTHOPAEDIC SURGERY

## 2022-06-10 PROCEDURE — G8427 DOCREV CUR MEDS BY ELIG CLIN: HCPCS | Performed by: ORTHOPAEDIC SURGERY

## 2022-06-10 PROCEDURE — G8417 CALC BMI ABV UP PARAM F/U: HCPCS | Performed by: ORTHOPAEDIC SURGERY

## 2022-06-10 PROCEDURE — 1123F ACP DISCUSS/DSCN MKR DOCD: CPT | Performed by: ORTHOPAEDIC SURGERY

## 2022-06-10 PROCEDURE — G8536 NO DOC ELDER MAL SCRN: HCPCS | Performed by: ORTHOPAEDIC SURGERY

## 2022-06-10 RX ORDER — TRIAMCINOLONE ACETONIDE 40 MG/ML
40 INJECTION, SUSPENSION INTRA-ARTICULAR; INTRAMUSCULAR ONCE
Status: COMPLETED | OUTPATIENT
Start: 2022-06-10 | End: 2022-06-10

## 2022-06-10 RX ORDER — MELOXICAM 7.5 MG/1
7.5 TABLET ORAL DAILY
Qty: 60 TABLET | Refills: 0 | Status: SHIPPED | OUTPATIENT
Start: 2022-06-10

## 2022-06-10 RX ADMIN — TRIAMCINOLONE ACETONIDE 40 MG: 40 INJECTION, SUSPENSION INTRA-ARTICULAR; INTRAMUSCULAR at 08:58

## 2022-06-10 NOTE — PROGRESS NOTES
Fozia Arora (: 1945) is a 69 y.o. male patient, here for evaluation of the following chief complaint(s):  Knee Pain (left knee pain)       ASSESSMENT/PLAN:  Below is the assessment and plan developed based on review of pertinent history, physical exam, labs, studies, and medications. 69-year-old male comes in today complaining of left-sided knee pain. He says been going on for couple months. He has intermittent swelling. He had a previous right partial knee and said this is doing very well. Regarding the left knee we discussed options. He has medial joint space narrowing consistent with osteoarthritis. He would like to pursue conservative treatment. I injected 40 mg of triamcinolone into his left knee joint using sterile technique. Tolerated very well. I am also going to call in some meloxicam.  He can discontinue his diclofenac    1. Chronic pain of left knee  -     XR KNEE LT MIN 4 V; Future  2. Arthritis of left knee  -     DRAIN/INJECT LARGE JOINT/BURSA      Encounter Diagnoses   Name Primary?  Chronic pain of left knee Yes    Arthritis of left knee         No follow-ups on file. SUBJECTIVE/OBJECTIVE:  Fozia Arora (: 1945) is a 69 y.o. male who presents today for the following:  Chief Complaint   Patient presents with    Knee Pain     left knee pain       69-year-old male comes in today complaining of left-sided knee pain. He says been going on for couple months. He has intermittent swelling. He had a previous right partial knee and said this is doing very well. Pain is rated as mild to moderate. He describes it as aching. He can walk less than blocks without pain    IMAGING:  XR Results (most recent):  Results from Appointment encounter on 06/10/22    XR KNEE LT MIN 4 V    Narrative  4 views ordered and independently reviewed. Knees. Status post partial knee replacement on the right. There is some mild radiolucency under the tibial baseplate.   Left knee reveals mild medial joint space narrowing with osteophyte formation present       Allergies   Allergen Reactions    Oxycodone Other (comments)     Keeps him away, \"hyper\"       Current Outpatient Medications   Medication Sig    meloxicam (MOBIC) 7.5 mg tablet Take 1 Tablet by mouth daily.  aspirin delayed-release 325 mg tablet Take 1 Tab by mouth two (2) times a day.  diclofenac EC (VOLTAREN) 50 mg EC tablet Take 1 Tab by mouth two (2) times a day.  HYDROmorphone (DILAUDID) 2 mg tablet Take 1 Tab by mouth every four (4) hours as needed for Pain. Max Daily Amount: 12 mg.    doxazosin (CARDURA) 2 mg tablet Take 2 mg by mouth nightly.  ferrous sulfate (IRON) 325 mg (65 mg iron) tablet Take 65 mg by mouth Daily (before breakfast).  calcium carbonate (CALTREX) 600 mg calcium (1,500 mg) tablet Take 600 mg by mouth daily.  carvedilol (COREG) 12.5 mg tablet Take 12.5 mg by mouth two (2) times daily (with meals).  cholecalciferol (VITAMIN D3) 1,000 unit tablet Take 1,000 Units by mouth daily.  losartan (COZAAR) 100 mg tablet Take 100 mg by mouth daily.  GARLIC PO Take 1 Cap by mouth daily. 1000 MG    amLODIPine (NORVASC) 10 mg tablet Take 10 mg by mouth daily.  DOCOSAHEXANOIC ACID/EPA (FISH OIL PO) Take 1,200 mg by mouth daily.  MULTIVITAMIN W-MINERALS/LUTEIN (CENTRUM SILVER PO) Take  by mouth. Takes one po daily.      Current Facility-Administered Medications   Medication    triamcinolone acetonide (KENALOG-40) 40 mg/mL injection 40 mg       Past Medical History:   Diagnosis Date    Arthritis     thumbs    GERD (gastroesophageal reflux disease)     hx of    Hypertension     Other ill-defined conditions(474.02)     bilateral carpal tunnel        Past Surgical History:   Procedure Laterality Date    HX APPENDECTOMY      HX KNEE ARTHROSCOPY Right     for torn menicus    HX ORTHOPAEDIC      back    HX OTHER SURGICAL      lipoma removed    HX SEPTOPLASTY         Family History Problem Relation Age of Onset    Kidney Disease Mother         dialysis    Cancer Father         prostate    Hypertension Father     Anesth Problems Neg Hx         Social History     Tobacco Use    Smoking status: Never Smoker    Smokeless tobacco: Never Used   Substance Use Topics    Alcohol use: No        All systems reviewed x 12 and were negative with the exception of None      No flowsheet data found. Vitals:  Ht 5' 10\" (1.778 m)   Wt 180 lb (81.6 kg)   BMI 25.83 kg/m²    Body mass index is 25.83 kg/m². Physical Exam    General: NAD, well developed, well nourished, alert and oriented x 3. Cardiac: Extremities well perfused    Respiratory: Nonlabored breathing    LLE: Normal gait and station. Negative stinchfield. Mild effusion. Medial joint line. No previous incisions noted. ROM 0-120 degrees. Grossly stable to varus/valgus stress and anterior/posterior drawer tests. Negative McMurrays. Motor grossly intact. RLE: Normal gait and station. Negative stinchfield. No effusion noted. Midline incision well-healed. ROM 0-120 degrees. Grossly stable to varus/valgus stress and anterior/posterior drawer tests. Negative McMurrays. Motor grossly intact. Vascular: Palpable pedal pulses, equal bilaterally. Skin: Warm well perfused, cap refill < 2 sec. An electronic signature was used to authenticate this note.   -- Avis Magaña MD

## 2023-04-29 RX ORDER — ASPIRIN 325 MG
TABLET, DELAYED RELEASE (ENTERIC COATED) ORAL 2 TIMES DAILY
COMMUNITY
Start: 2017-08-14

## 2023-04-29 RX ORDER — HYDROMORPHONE HYDROCHLORIDE 2 MG/1
TABLET ORAL EVERY 4 HOURS PRN
COMMUNITY
Start: 2017-08-14

## 2023-04-29 RX ORDER — AMLODIPINE BESYLATE 10 MG/1
TABLET ORAL DAILY
COMMUNITY

## 2023-04-29 RX ORDER — CARVEDILOL 12.5 MG/1
TABLET ORAL 2 TIMES DAILY WITH MEALS
COMMUNITY

## 2023-04-29 RX ORDER — LOSARTAN POTASSIUM 100 MG/1
TABLET ORAL DAILY
COMMUNITY

## 2023-04-29 RX ORDER — MELOXICAM 7.5 MG/1
TABLET ORAL DAILY
COMMUNITY
Start: 2022-06-10

## 2023-04-29 RX ORDER — FERROUS SULFATE 325(65) MG
TABLET ORAL
COMMUNITY

## 2023-04-29 RX ORDER — DOXAZOSIN 2 MG/1
TABLET ORAL
COMMUNITY

## 2023-05-20 ENCOUNTER — HOSPITAL ENCOUNTER (EMERGENCY)
Facility: HOSPITAL | Age: 78
Discharge: HOME | End: 2023-05-20
Attending: EMERGENCY MEDICINE
Payer: MEDICARE

## 2023-05-20 VITALS
DIASTOLIC BLOOD PRESSURE: 96 MMHG | RESPIRATION RATE: 18 BRPM | WEIGHT: 173.06 LBS | HEART RATE: 61 BPM | SYSTOLIC BLOOD PRESSURE: 181 MMHG | TEMPERATURE: 98.4 F | BODY MASS INDEX: 24.83 KG/M2 | OXYGEN SATURATION: 96 %

## 2023-05-20 DIAGNOSIS — R30.0 DYSURIA: Primary | ICD-10-CM

## 2023-05-20 LAB
ALBUMIN SERPL-MCNC: 3.8 G/DL (ref 3.5–5)
ALBUMIN/GLOB SERPL: 1.2 (ref 1.1–2.2)
ALP SERPL-CCNC: 68 U/L (ref 45–117)
ALT SERPL-CCNC: 30 U/L (ref 12–78)
ANION GAP SERPL CALC-SCNC: 4 MMOL/L (ref 5–15)
APPEARANCE UR: CLEAR
AST SERPL-CCNC: 22 U/L (ref 15–37)
BACTERIA URNS QL MICRO: NEGATIVE /HPF
BASOPHILS # BLD: 0 K/UL (ref 0–0.1)
BASOPHILS NFR BLD: 1 % (ref 0–1)
BILIRUB SERPL-MCNC: 0.5 MG/DL (ref 0.2–1)
BILIRUB UR QL: NEGATIVE
BUN SERPL-MCNC: 48 MG/DL (ref 6–20)
BUN/CREAT SERPL: 22 (ref 12–20)
CALCIUM SERPL-MCNC: 8.9 MG/DL (ref 8.5–10.1)
CHLORIDE SERPL-SCNC: 109 MMOL/L (ref 97–108)
CO2 SERPL-SCNC: 23 MMOL/L (ref 21–32)
COLOR UR: ABNORMAL
CREAT SERPL-MCNC: 2.22 MG/DL (ref 0.7–1.3)
DIFFERENTIAL METHOD BLD: ABNORMAL
EOSINOPHIL # BLD: 0.1 K/UL (ref 0–0.4)
EOSINOPHIL NFR BLD: 3 % (ref 0–7)
EPITH CASTS URNS QL MICRO: ABNORMAL /LPF
ERYTHROCYTE [DISTWIDTH] IN BLOOD BY AUTOMATED COUNT: 12.3 % (ref 11.5–14.5)
GLOBULIN SER CALC-MCNC: 3.3 G/DL (ref 2–4)
GLUCOSE SERPL-MCNC: 198 MG/DL (ref 65–100)
GLUCOSE UR STRIP.AUTO-MCNC: NEGATIVE MG/DL
HCT VFR BLD AUTO: 33.3 % (ref 36.6–50.3)
HGB BLD-MCNC: 11.5 G/DL (ref 12.1–17)
HGB UR QL STRIP: NEGATIVE
HYALINE CASTS URNS QL MICRO: ABNORMAL /LPF (ref 0–5)
IMM GRANULOCYTES # BLD AUTO: 0 K/UL (ref 0–0.04)
IMM GRANULOCYTES NFR BLD AUTO: 0 % (ref 0–0.5)
KETONES UR QL STRIP.AUTO: NEGATIVE MG/DL
LEUKOCYTE ESTERASE UR QL STRIP.AUTO: NEGATIVE
LYMPHOCYTES # BLD: 1.2 K/UL (ref 0.8–3.5)
LYMPHOCYTES NFR BLD: 27 % (ref 12–49)
MCH RBC QN AUTO: 31 PG (ref 26–34)
MCHC RBC AUTO-ENTMCNC: 34.5 G/DL (ref 30–36.5)
MCV RBC AUTO: 89.8 FL (ref 80–99)
MONOCYTES # BLD: 0.5 K/UL (ref 0–1)
MONOCYTES NFR BLD: 11 % (ref 5–13)
NEUTS SEG # BLD: 2.6 K/UL (ref 1.8–8)
NEUTS SEG NFR BLD: 58 % (ref 32–75)
NITRITE UR QL STRIP.AUTO: NEGATIVE
NRBC # BLD: 0 K/UL (ref 0–0.01)
NRBC BLD-RTO: 0 PER 100 WBC
PH UR STRIP: 5.5 (ref 5–8)
PLATELET # BLD AUTO: 200 K/UL (ref 150–400)
PMV BLD AUTO: 9.6 FL (ref 8.9–12.9)
POTASSIUM SERPL-SCNC: 4.7 MMOL/L (ref 3.5–5.1)
PROT SERPL-MCNC: 7.1 G/DL (ref 6.4–8.2)
PROT UR STRIP-MCNC: 100 MG/DL
RBC # BLD AUTO: 3.71 M/UL (ref 4.1–5.7)
RBC #/AREA URNS HPF: ABNORMAL /HPF (ref 0–5)
SODIUM SERPL-SCNC: 136 MMOL/L (ref 136–145)
SP GR UR REFRACTOMETRY: 1.01 (ref 1–1.03)
SPECIMEN HOLD: NORMAL
UROBILINOGEN UR QL STRIP.AUTO: 0.2 EU/DL (ref 0.2–1)
WBC # BLD AUTO: 4.4 K/UL (ref 4.1–11.1)
WBC URNS QL MICRO: ABNORMAL /HPF (ref 0–4)

## 2023-05-20 PROCEDURE — 99283 EMERGENCY DEPT VISIT LOW MDM: CPT

## 2023-05-20 PROCEDURE — 80053 COMPREHEN METABOLIC PANEL: CPT

## 2023-05-20 PROCEDURE — 6370000000 HC RX 637 (ALT 250 FOR IP): Performed by: EMERGENCY MEDICINE

## 2023-05-20 PROCEDURE — 85025 COMPLETE CBC W/AUTO DIFF WBC: CPT

## 2023-05-20 PROCEDURE — 36415 COLL VENOUS BLD VENIPUNCTURE: CPT

## 2023-05-20 PROCEDURE — 81001 URINALYSIS AUTO W/SCOPE: CPT

## 2023-05-20 RX ORDER — ACETAMINOPHEN 500 MG
1000 TABLET ORAL
Status: COMPLETED | OUTPATIENT
Start: 2023-05-20 | End: 2023-05-20

## 2023-05-20 RX ORDER — MORPHINE SULFATE 2 MG/ML
2 INJECTION, SOLUTION INTRAMUSCULAR; INTRAVENOUS
Status: DISCONTINUED | OUTPATIENT
Start: 2023-05-20 | End: 2023-05-20

## 2023-05-20 RX ADMIN — ACETAMINOPHEN 1000 MG: 500 TABLET ORAL at 12:22

## 2023-05-20 ASSESSMENT — ENCOUNTER SYMPTOMS
VOMITING: 0
CONSTIPATION: 0
COLOR CHANGE: 0
NAUSEA: 0
DIARRHEA: 0
ABDOMINAL PAIN: 0
SHORTNESS OF BREATH: 0
BACK PAIN: 0

## 2023-05-20 ASSESSMENT — PAIN - FUNCTIONAL ASSESSMENT: PAIN_FUNCTIONAL_ASSESSMENT: NONE - DENIES PAIN

## 2023-05-20 ASSESSMENT — PAIN SCALES - GENERAL: PAINLEVEL_OUTOF10: 8

## 2023-05-20 NOTE — ED PROVIDER NOTES
10:59 AM  I have evaluated the patient as the Provider in Triage. I have reviewed his vital signs and the triage nurse assessment. I have talked with the patient and any available family and advised that I am the provider in triage and have ordered the appropriate study to initiate their work up based on the clinical presentation during my assessment. I have advised that the patient will be accommodated in the Main ED as soon as possible. I have also requested to contact the triage nurse or myself immediately if the patient experiences any changes in their condition during this brief waiting period.   LAURA Umaña NP, APRN - NP  05/20/23 1109

## 2023-05-20 NOTE — ED NOTES
[facility] EMERGENCY DEP  EMERGENCY DEPARTMENT ENCOUNTER      Pt Name: Sharda Rossi  MRN: [id number]  Nahomi 1945  Date of evaluation: 5/20/2023  Provider: Chhaya Fountain MD    CHIEF COMPLAINT       Chief Complaint   Patient presents with    Dysuria         HISTORY OF PRESENT ILLNESS   (Location/Symptom, Timing/Onset, Context/Setting, Quality, Duration, Modifying Factors, Severity)  Note limiting factors. The history is provided by the patient and the spouse. No  was used. Dysuria   This is a new problem. The current episode started yesterday. The problem occurs every urination. The problem has not changed since onset. The quality of the pain is described as burning. The pain is moderate. There has been no fever. Pertinent negatives include no chills, no sweats, no nausea, no vomiting, no discharge, no frequency, no hematuria, no hesitancy, no urgency and no flank pain. His past medical history does not include kidney stones, single kidney, urological procedure, recurrent UTIs, urinary stasis or catheterization. Review of External Medical Records:     Nursing Notes were reviewed. REVIEW OF SYSTEMS    (2-9 systems for level 4, 10 or more for level 5)     Review of Systems   Constitutional:  Negative for activity change, chills and fever. HENT:  Negative for nosebleeds. Eyes:  Negative for visual disturbance. Respiratory:  Negative for shortness of breath. Cardiovascular:  Negative for chest pain and palpitations. Gastrointestinal:  Negative for abdominal pain, constipation, diarrhea, nausea and vomiting. Genitourinary:  Positive for dysuria. Negative for difficulty urinating, flank pain, frequency, hematuria, hesitancy and urgency. Musculoskeletal:  Negative for back pain, neck pain and neck stiffness. Skin:  Negative for color change. Allergic/Immunologic: Negative for immunocompromised state.    Neurological:  Negative for dizziness, seizures, syncope,

## 2023-10-31 ENCOUNTER — ANESTHESIA (OUTPATIENT)
Facility: HOSPITAL | Age: 78
End: 2023-10-31
Payer: MEDICARE

## 2023-10-31 ENCOUNTER — ANESTHESIA EVENT (OUTPATIENT)
Facility: HOSPITAL | Age: 78
End: 2023-10-31
Payer: MEDICARE

## 2023-10-31 ENCOUNTER — HOSPITAL ENCOUNTER (OUTPATIENT)
Facility: HOSPITAL | Age: 78
Setting detail: OUTPATIENT SURGERY
Discharge: HOME | End: 2023-10-31
Attending: INTERNAL MEDICINE | Admitting: INTERNAL MEDICINE
Payer: MEDICARE

## 2023-10-31 VITALS
TEMPERATURE: 98.7 F | HEIGHT: 70 IN | BODY MASS INDEX: 25.77 KG/M2 | DIASTOLIC BLOOD PRESSURE: 94 MMHG | SYSTOLIC BLOOD PRESSURE: 160 MMHG | WEIGHT: 180 LBS | HEART RATE: 73 BPM | OXYGEN SATURATION: 97 % | RESPIRATION RATE: 23 BRPM

## 2023-10-31 PROCEDURE — 2500000003 HC RX 250 WO HCPCS: Performed by: NURSE ANESTHETIST, CERTIFIED REGISTERED

## 2023-10-31 PROCEDURE — 3700000000 HC ANESTHESIA ATTENDED CARE: Performed by: INTERNAL MEDICINE

## 2023-10-31 PROCEDURE — 6360000002 HC RX W HCPCS: Performed by: NURSE ANESTHETIST, CERTIFIED REGISTERED

## 2023-10-31 PROCEDURE — 2580000003 HC RX 258: Performed by: INTERNAL MEDICINE

## 2023-10-31 PROCEDURE — 3600007502: Performed by: INTERNAL MEDICINE

## 2023-10-31 PROCEDURE — 7100000011 HC PHASE II RECOVERY - ADDTL 15 MIN: Performed by: INTERNAL MEDICINE

## 2023-10-31 PROCEDURE — 7100000010 HC PHASE II RECOVERY - FIRST 15 MIN: Performed by: INTERNAL MEDICINE

## 2023-10-31 RX ORDER — SODIUM CHLORIDE 0.9 % (FLUSH) 0.9 %
5-40 SYRINGE (ML) INJECTION EVERY 12 HOURS SCHEDULED
Status: DISCONTINUED | OUTPATIENT
Start: 2023-10-31 | End: 2023-10-31 | Stop reason: HOSPADM

## 2023-10-31 RX ORDER — LIDOCAINE HYDROCHLORIDE 20 MG/ML
INJECTION, SOLUTION EPIDURAL; INFILTRATION; INTRACAUDAL; PERINEURAL PRN
Status: DISCONTINUED | OUTPATIENT
Start: 2023-10-31 | End: 2023-10-31 | Stop reason: SDUPTHER

## 2023-10-31 RX ORDER — SODIUM CHLORIDE 9 MG/ML
25 INJECTION, SOLUTION INTRAVENOUS PRN
Status: DISCONTINUED | OUTPATIENT
Start: 2023-10-31 | End: 2023-10-31 | Stop reason: HOSPADM

## 2023-10-31 RX ORDER — SODIUM CHLORIDE 0.9 % (FLUSH) 0.9 %
5-40 SYRINGE (ML) INJECTION PRN
Status: DISCONTINUED | OUTPATIENT
Start: 2023-10-31 | End: 2023-10-31 | Stop reason: HOSPADM

## 2023-10-31 RX ORDER — SODIUM CHLORIDE 9 MG/ML
INJECTION, SOLUTION INTRAVENOUS CONTINUOUS
Status: DISCONTINUED | OUTPATIENT
Start: 2023-10-31 | End: 2023-10-31 | Stop reason: HOSPADM

## 2023-10-31 RX ADMIN — PROPOFOL 25 MG: 10 INJECTION, EMULSION INTRAVENOUS at 15:25

## 2023-10-31 RX ADMIN — PROPOFOL 25 MG: 10 INJECTION, EMULSION INTRAVENOUS at 15:27

## 2023-10-31 RX ADMIN — PROPOFOL 50 MG: 10 INJECTION, EMULSION INTRAVENOUS at 15:23

## 2023-10-31 RX ADMIN — LIDOCAINE HYDROCHLORIDE 50 MG: 20 INJECTION, SOLUTION EPIDURAL; INFILTRATION; INTRACAUDAL; PERINEURAL at 15:23

## 2023-10-31 RX ADMIN — SODIUM CHLORIDE: 9 INJECTION, SOLUTION INTRAVENOUS at 15:09

## 2023-10-31 RX ADMIN — PROPOFOL 25 MG: 10 INJECTION, EMULSION INTRAVENOUS at 15:24

## 2023-10-31 RX ADMIN — PROPOFOL 25 MG: 10 INJECTION, EMULSION INTRAVENOUS at 15:29

## 2023-10-31 ASSESSMENT — PAIN SCALES - GENERAL
PAINLEVEL_OUTOF10: 0
PAINLEVEL_OUTOF10: 0

## 2023-10-31 ASSESSMENT — PAIN - FUNCTIONAL ASSESSMENT: PAIN_FUNCTIONAL_ASSESSMENT: 0-10

## 2023-10-31 NOTE — H&P
[address]        History and Physical       NAME:  Tico Castillo   :   1945   MRN:   [id number]             History of Present Illness:  Patient is a 69 y.o. who is seen for colon cancer screening. Last colonoscopy was in  and was normal.     PMH:  Past Medical History:   Diagnosis Date    Arthritis     thumbs    GERD (gastroesophageal reflux disease)     hx of    Hypertension     Other ill-defined conditions(799.89)     bilateral carpal tunnel       PSH:  Past Surgical History:   Procedure Laterality Date    APPENDECTOMY      KNEE ARTHROSCOPY Right     for torn menicus    ORTHOPEDIC SURGERY      back    OTHER SURGICAL HISTORY      lipoma removed    SEPTOPLASTY         Allergies: Allergies   Allergen Reactions    Oxycodone Other (See Comments)     Keeps him away, \"hyper\"       Home Medications:  Prior to Admission Medications   Prescriptions Last Dose Informant Patient Reported? Taking? Ascorbic Acid (VITAMIN C PO) 10/30/2023  Yes No   Sig: Take by mouth daily   Fexofenadine HCl (ALLEGRA ALLERGY PO) 10/30/2023  Yes No   Sig: Take by mouth daily   GARLIC PO   Yes No   Sig: Take 1 capsule by mouth daily   HYDROmorphone (DILAUDID) 2 MG tablet   Yes No   Sig: Take by mouth every 4 hours as needed.    Multiple Vitamins-Minerals (CENTRUM ADULTS PO) 10/30/2023  Yes No   Sig: Take by mouth daily   amLODIPine (NORVASC) 10 MG tablet 10/31/2023  Yes No   Sig: Take by mouth daily   aspirin 325 MG EC tablet 10/30/2023  Yes No   Sig: Take by mouth 2 times daily   calcium carbonate 1500 (600 Ca) MG TABS tablet   Yes No   Sig: Take by mouth daily   carvedilol (COREG) 12.5 MG tablet 10/31/2023  Yes No   Sig: Take by mouth 2 times daily (with meals)   diclofenac (VOLTAREN) 50 MG EC tablet   Yes No   Sig: Take by mouth 2 times daily   doxazosin (CARDURA) 2 MG tablet 10/31/2023  Yes No   Sig: Take by mouth   ferrous sulfate (IRON 325) 325 (65

## 2023-10-31 NOTE — ANESTHESIA PRE PROCEDURE
Department of Anesthesiology  Preprocedure Note       Name:  Faye Garcia   Age:  69 y.o.  :  1945                                          MRN:  [id number]         Date:  10/31/2023      Surgeon: Gabi Benito):  Nicanor Munson MD    Procedure: Procedure(s):  COLONOSCOPY    Medications prior to admission:   Prior to Admission medications    Medication Sig Start Date End Date Taking? Authorizing Provider   Multiple Vitamins-Minerals (CENTRUM ADULTS PO) Take by mouth daily    Evelia Winkler MD   Ascorbic Acid (VITAMIN C PO) Take by mouth daily    Evelia Winkler MD   Fexofenadine HCl (ALLEGRA ALLERGY PO) Take by mouth daily    Evelia Winkler MD   GARLIC PO Take 1 capsule by mouth daily    Automatic Reconciliation, Ar   amLODIPine (NORVASC) 10 MG tablet Take by mouth daily    Automatic Reconciliation, Ar   aspirin 325 MG EC tablet Take by mouth 2 times daily 17   Automatic Reconciliation, Ar   calcium carbonate 1500 (600 Ca) MG TABS tablet Take by mouth daily    Automatic Reconciliation, Ar   carvedilol (COREG) 12.5 MG tablet Take by mouth 2 times daily (with meals)    Automatic Reconciliation, Ar   vitamin D (CHOLECALCIFEROL) 25 MCG (1000 UT) TABS tablet Take by mouth daily    Automatic Reconciliation, Ar   diclofenac (VOLTAREN) 50 MG EC tablet Take by mouth 2 times daily 8/15/17   Automatic Reconciliation, Ar   doxazosin (CARDURA) 2 MG tablet Take by mouth    Automatic Reconciliation, Ar   ferrous sulfate (IRON 325) 325 (65 Fe) MG tablet Take by mouth every morning (before breakfast)    Automatic Reconciliation, Ar   HYDROmorphone (DILAUDID) 2 MG tablet Take by mouth every 4 hours as needed.  17   Automatic Reconciliation, Ar   losartan (COZAAR) 100 MG tablet Take by mouth daily    Automatic Reconciliation, Ar   meloxicam (MOBIC) 7.5 MG tablet Take by mouth daily 6/10/22   Automatic Reconciliation, Ar       Current medications:    Current Facility-Administered Medications

## 2023-10-31 NOTE — OP NOTE
[facility]  [address] [place] [address], [address] [facility]        Colonoscopy Operative Report    Rafa Monte  [id number]  1945      Procedure Type:   Colonoscopy --screening     Indications:    Screening colonoscopy         Pre-operative Diagnosis: see indication above    Post-operative Diagnosis:  See findings below    :  Lorraine Davenport MD    Staff: Circulator: Burak Garcias RN  Endoscopy Technician: Max Christensen     Referring Provider: Dilan Huntley MD      Sedation:  MAC      Procedure Details:  After informed consent was obtained with all risks and benefits of procedure explained and preoperative exam completed, the patient was taken to the endoscopy suite and placed in the left lateral decubitus position. Upon sequential sedation as per above, a digital rectal exam was performed demonstrating internal hemorrhoids. The Olympus pediatric videocolonoscope  was inserted in the rectum and carefully advanced to the terminal ileum. The cecum was identified by the ileocecal valve and appendiceal orifice. The quality of preparation was good. The colonoscope was slowly withdrawn with careful evaluation between folds. Retroflexion in the rectum was completed . Findings:   Normal colon and terminal ileum. Specimen Removed:  none    Complications: None. EBL:  None. Impression:     As above    Recommendations:  Given age of 69, no further screening colonoscopies are indicated based on current guidelines. High fiber diet education.     Signed By: Lorraine Davenport MD     10/31/2023  3:36 PM

## 2023-10-31 NOTE — ANESTHESIA POSTPROCEDURE EVALUATION
Department of Anesthesiology  Postprocedure Note    Patient: Michelle Suarez  MRN: [id number]  YOB: 1945  Date of evaluation: 10/31/2023      Procedure Summary     Date: 10/31/23 Room / Location: [facility] ENDO  / [facility] ENDOSCOPY    Anesthesia Start: 2561 Anesthesia Stop: 2993    Procedure: COLONOSCOPY (Lower GI Region) Diagnosis:       Screen for colon cancer      (Screen for colon cancer [Z12.11])    Surgeons: Jeferson Arellano MD Responsible Provider: Harsh Manuel DO    Anesthesia Type: MAC ASA Status: 2          Anesthesia Type: MAC    Lasha Phase I: Lasha Score: 10    Lasha Phase II: Lasha Score: 10      Anesthesia Post Evaluation    Patient location during evaluation: bedside  Patient participation: complete - patient participated  Level of consciousness: awake and alert  Pain score: 0  Airway patency: patent  Nausea & Vomiting: no nausea and no vomiting  Complications: no  Cardiovascular status: blood pressure returned to baseline and hemodynamically stable  Respiratory status: room air  Hydration status: euvolemic  Multimodal analgesia pain management approach  Pain management: adequate

## 2023-10-31 NOTE — DISCHARGE INSTRUCTIONS
[facility]    COLON DISCHARGE INSTRUCTIONS    Chikis Toro  [id number]  1945    Discomfort:  Redness at IV site- apply warm compress to area; if redness or soreness persist- contact your physician  There may be a slight amount of blood passed from the rectum  Gaseous discomfort- walking, belching will help relieve any discomfort  You may not operate a vehicle for 12 hours  You may not engage in an occupation involving machinery or appliances for rest of today  You may not drink alcoholic beverages for at least 12 hours  Avoid making any critical decisions for at least 24 hour  DIET:  You may resume your regular diet - however -  remember your colon is empty and a heavy meal will produce gas. Avoid these foods:  vegetables, fried / greasy foods, carbonated drinks     ACTIVITY:  You may  resume your normal daily activities it is recommended that you spend the remainder of the day resting -  avoid any strenuous activity. CALL M.D. ANY SIGN OF:   Increasing pain, nausea, vomiting  Abdominal distension (swelling)  New increased bleeding (oral or rectal)  Fever (chills)  Pain in chest area  Bloody discharge from nose or mouth  Shortness of breath      Follow-up Instructions:   Call Dr. Ag Gomes for any questions or problems at [address]:   Your colonoscopy showed no polyps. Given your age of 69, no further screening colonoscopies are needed.   Telephone # [phone]      Signed By: Ag Gomes MD     10/31/2023  3:35 PM

## (undated) DEVICE — NEEDLE HYPO 18GA L1.5IN PNK S STL HUB POLYPR SHLD REG BVL

## (undated) DEVICE — 3M™ IOBAN™ 2 ANTIMICROBIAL INCISE DRAPE 6651EZ: Brand: IOBAN™ 2

## (undated) DEVICE — STERILE POLYISOPRENE POWDER-FREE SURGICAL GLOVES: Brand: PROTEXIS

## (undated) DEVICE — HANDPIECE SET WITH BONE CLEANING TIP AND SUCTION TUBE: Brand: INTERPULSE

## (undated) DEVICE — FRAZIER SUCTION INSTRUMENT 12 FR W/CONTROL VENT & OBTURATOR: Brand: FRAZIER

## (undated) DEVICE — SUTURE MCRYL SZ 3-0 L27IN ABSRB UD L19MM PS-2 3/8 CIR PRIM Y427H

## (undated) DEVICE — T4 HOOD

## (undated) DEVICE — SOLUTION IRRIG 3000ML 0.9% SOD CHL FLX CONT 0797208] ICU MEDICAL INC]

## (undated) DEVICE — DRAPE,EXTREMITY,89X128,STERILE: Brand: MEDLINE

## (undated) DEVICE — NEEDLE HYPO 21GA L1.5IN INTRAMUSCULAR S STL LATCH BVL UP

## (undated) DEVICE — MIXING SYS CEM BNE VAC -- QUICKVAC 15/BX

## (undated) DEVICE — INFECTION CONTROL KIT SYS

## (undated) DEVICE — SUTURE VCRL SZ 2-0 L36IN ABSRB UD L40MM CT 1/2 CIR J957H

## (undated) DEVICE — SYRINGE MED 20ML STD CLR PLAS LUERLOCK TIP N CTRL DISP

## (undated) DEVICE — PREP SKN PREVAIL 40ML APPL --

## (undated) DEVICE — 2108 SERIES SAGITTAL BLADE, NO OFFSET  (12.4 X 1.19 X 82.1MM)

## (undated) DEVICE — CATH KT URETH INTMIT 15FR LTX -- CONVERT TO ITEM 363176

## (undated) DEVICE — DEVON™ KNEE AND BODY STRAP 60" X 3" (1.5 M X 7.6 CM): Brand: DEVON

## (undated) DEVICE — SUTURE STRATAFIX SPRL SZ 1 L14IN ABSRB VLT L48CM CTX 1/2 SXPD2B405

## (undated) DEVICE — DERMABOND SKIN ADH 0.7ML -- DERMABOND ADVANCED 12/BX

## (undated) DEVICE — SCRUB DRY SURG EZ SCRUB BRUSH PREOPERATIVE GRN

## (undated) DEVICE — SUTURE VCRL 1 L27IN ABSRB CT BRAID COAT UD J281H

## (undated) DEVICE — RECIPROCATING BLADE HEAVY DUTY LONG, OFFSET  (77.6 X 0.77 X 11.2MM)

## (undated) DEVICE — HOOK LOCK LATEX FREE ELASTIC BANDAGE D/L 6INX10YD

## (undated) DEVICE — Device

## (undated) DEVICE — PADDING CST 6IN STERILE --

## (undated) DEVICE — PADDING CAST SPEC 6INX4YD COT --

## (undated) DEVICE — ZIMMER® STERILE DISPOSABLE TOURNIQUET CUFF WITH PLC, DUAL PORT, SINGLE BLADDER, 34 IN. (86 CM)

## (undated) DEVICE — REM POLYHESIVE ADULT PATIENT RETURN ELECTRODE: Brand: VALLEYLAB

## (undated) DEVICE — Z CONVERTED USE 2271043 CONTAINER SPEC COLL 4OZ SCR ON LID PEEL PCH

## (undated) DEVICE — 4-PORT MANIFOLD: Brand: NEPTUNE 2

## (undated) DEVICE — DRSG AQUACEL SURG 3.5 X 10IN -- CONVERT TO ITEM 370183